# Patient Record
Sex: MALE | Race: WHITE | NOT HISPANIC OR LATINO | Employment: PART TIME | URBAN - METROPOLITAN AREA
[De-identification: names, ages, dates, MRNs, and addresses within clinical notes are randomized per-mention and may not be internally consistent; named-entity substitution may affect disease eponyms.]

---

## 2023-09-07 PROBLEM — G47.33 OBSTRUCTIVE SLEEP APNEA: Status: ACTIVE | Noted: 2023-09-07

## 2023-09-07 PROBLEM — E78.2 MIXED HYPERLIPIDEMIA: Status: ACTIVE | Noted: 2023-09-07

## 2023-09-07 PROBLEM — R73.9 HYPERGLYCEMIA: Status: ACTIVE | Noted: 2023-09-07

## 2023-09-07 PROBLEM — T14.8XXA CONTUSION OF CLAVICLE: Status: ACTIVE | Noted: 2023-09-07

## 2023-09-07 PROBLEM — M71.10 SEPTIC BURSITIS: Status: ACTIVE | Noted: 2023-09-07

## 2023-09-07 PROBLEM — S51.811A SKIN TEAR OF RIGHT FOREARM WITHOUT COMPLICATION: Status: ACTIVE | Noted: 2023-09-07
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
## 2023-09-07 PROBLEM — E66.9 CLASS 1 OBESITY WITH BODY MASS INDEX (BMI) OF 33.0 TO 33.9 IN ADULT: Status: ACTIVE | Noted: 2023-09-07

## 2023-09-07 PROBLEM — E66.9 OBESE: Status: ACTIVE | Noted: 2023-09-07

## 2023-09-07 PROBLEM — S20.219A CHEST WALL CONTUSION: Status: ACTIVE | Noted: 2023-09-07

## 2023-09-07 PROBLEM — E11.9 TYPE 2 DIABETES MELLITUS (MULTI): Status: ACTIVE | Noted: 2023-09-07

## 2023-09-07 PROBLEM — E66.811 CLASS 1 OBESITY WITH BODY MASS INDEX (BMI) OF 33.0 TO 33.9 IN ADULT: Status: ACTIVE | Noted: 2023-09-07

## 2023-09-07 PROBLEM — H35.30 MACULAR DEGENERATION: Status: ACTIVE | Noted: 2023-09-07

## 2023-09-07 PROBLEM — I73.9 PERIPHERAL VASCULAR DISEASE (CMS-HCC): Status: ACTIVE | Noted: 2023-09-07

## 2023-09-07 PROBLEM — M25.469 KNEE SWELLING: Status: ACTIVE | Noted: 2023-09-07

## 2023-09-07 PROBLEM — R60.0 EDEMA OF LEFT LOWER EXTREMITY: Status: ACTIVE | Noted: 2023-09-07

## 2023-09-07 PROBLEM — S50.10XA FOREARM CONTUSION: Status: ACTIVE | Noted: 2023-09-07

## 2023-09-07 PROBLEM — I45.10 RIGHT BUNDLE BRANCH BLOCK (RBBB): Status: ACTIVE | Noted: 2023-09-07

## 2023-09-07 PROBLEM — R91.8 LUNG NODULES: Status: ACTIVE | Noted: 2023-09-07

## 2023-09-07 PROBLEM — I25.3: Status: ACTIVE | Noted: 2023-03-01

## 2023-09-07 PROBLEM — I10 HYPERTENSION, ESSENTIAL, BENIGN: Status: ACTIVE | Noted: 2023-03-01

## 2023-09-07 PROBLEM — I25.10 CAD (CORONARY ARTERY DISEASE): Status: ACTIVE | Noted: 2019-06-17

## 2023-09-07 PROBLEM — I77.810 DILATED AORTIC ROOT (CMS-HCC): Status: ACTIVE | Noted: 2023-09-07

## 2023-09-07 RX ORDER — LISINOPRIL 2.5 MG/1
2.5 TABLET ORAL DAILY
COMMUNITY
Start: 2019-12-30

## 2023-09-07 RX ORDER — HYDROCODONE BITARTRATE AND ACETAMINOPHEN 5; 325 MG/1; MG/1
TABLET ORAL
COMMUNITY
End: 2024-01-18

## 2023-09-07 RX ORDER — CEPHALEXIN 500 MG/1
500 CAPSULE ORAL 2 TIMES DAILY
COMMUNITY
Start: 2020-03-04 | End: 2024-01-18

## 2023-09-07 RX ORDER — SPIRONOLACTONE 25 MG/1
25 TABLET ORAL DAILY
COMMUNITY
Start: 2022-08-12

## 2023-09-07 RX ORDER — CARVEDILOL 6.25 MG/1
6.25 TABLET ORAL
COMMUNITY
End: 2024-01-18

## 2023-09-07 RX ORDER — DOXYCYCLINE HYCLATE 100 MG/1
100 TABLET, DELAYED RELEASE ORAL 2 TIMES DAILY
COMMUNITY
Start: 2020-03-04 | End: 2024-01-18

## 2023-09-07 RX ORDER — LISINOPRIL 10 MG/1
10 TABLET ORAL DAILY
COMMUNITY
End: 2024-01-18

## 2023-09-07 RX ORDER — ACETAMINOPHEN, DEXTROMETHORPHAN HYDROBROMIDE, GUAIFENESIN, AND PHENYLEPHRINE HYDROCHLORIDE 325; 10; 200; 5 MG/15ML; MG/15ML; MG/15ML; MG/15ML
SOLUTION ORAL
COMMUNITY
End: 2024-01-18

## 2023-09-07 RX ORDER — TAMSULOSIN HYDROCHLORIDE 0.4 MG/1
0.4 CAPSULE ORAL DAILY
COMMUNITY
End: 2024-01-18

## 2023-09-07 RX ORDER — CARVEDILOL 3.12 MG/1
1 TABLET ORAL DAILY
COMMUNITY
Start: 2019-12-30 | End: 2024-02-22 | Stop reason: SDUPTHER

## 2023-09-07 RX ORDER — ACETAMINOPHEN 500 MG
TABLET ORAL
COMMUNITY

## 2023-09-07 RX ORDER — TORSEMIDE 20 MG/1
20 TABLET ORAL 2 TIMES WEEKLY
COMMUNITY

## 2023-09-07 RX ORDER — ATORVASTATIN CALCIUM 10 MG/1
TABLET, FILM COATED ORAL
COMMUNITY
Start: 2022-08-12 | End: 2023-12-18 | Stop reason: SDUPTHER

## 2023-09-07 RX ORDER — ATORVASTATIN CALCIUM 20 MG/1
20 TABLET, FILM COATED ORAL DAILY
COMMUNITY
End: 2024-01-18

## 2023-09-07 RX ORDER — ASPIRIN 81 MG/1
81 TABLET ORAL DAILY
COMMUNITY
End: 2024-03-25

## 2023-09-07 RX ORDER — METHYLPHENIDATE HYDROCHLORIDE 10 MG/1
CAPSULE, EXTENDED RELEASE ORAL
COMMUNITY
End: 2024-01-18

## 2023-09-07 RX ORDER — SIMETHICONE 80 MG
TABLET,CHEWABLE ORAL
COMMUNITY
End: 2024-01-18

## 2023-09-07 RX ORDER — METHYLPHENIDATE HYDROCHLORIDE 5 MG/1
TABLET ORAL
COMMUNITY
Start: 2019-12-30

## 2023-10-12 ENCOUNTER — OFFICE VISIT (OUTPATIENT)
Dept: CARDIOLOGY | Facility: CLINIC | Age: 83
End: 2023-10-12
Payer: COMMERCIAL

## 2023-10-12 VITALS
HEART RATE: 66 BPM | SYSTOLIC BLOOD PRESSURE: 116 MMHG | HEIGHT: 68 IN | DIASTOLIC BLOOD PRESSURE: 71 MMHG | WEIGHT: 212 LBS | BODY MASS INDEX: 32.13 KG/M2

## 2023-10-12 DIAGNOSIS — N20.0 KIDNEY STONE: ICD-10-CM

## 2023-10-12 DIAGNOSIS — I77.810 DILATED AORTIC ROOT (CMS-HCC): ICD-10-CM

## 2023-10-12 DIAGNOSIS — I25.10 CORONARY ARTERY DISEASE INVOLVING NATIVE HEART, UNSPECIFIED VESSEL OR LESION TYPE, UNSPECIFIED WHETHER ANGINA PRESENT: ICD-10-CM

## 2023-10-12 DIAGNOSIS — E78.2 MIXED HYPERLIPIDEMIA: ICD-10-CM

## 2023-10-12 DIAGNOSIS — I10 HYPERTENSION, ESSENTIAL, BENIGN: ICD-10-CM

## 2023-10-12 DIAGNOSIS — I73.9 PERIPHERAL VASCULAR DISEASE (CMS-HCC): ICD-10-CM

## 2023-10-12 PROCEDURE — 99214 OFFICE O/P EST MOD 30 MIN: CPT | Performed by: INTERNAL MEDICINE

## 2023-10-12 PROCEDURE — 1036F TOBACCO NON-USER: CPT | Performed by: INTERNAL MEDICINE

## 2023-10-12 PROCEDURE — 3074F SYST BP LT 130 MM HG: CPT | Performed by: INTERNAL MEDICINE

## 2023-10-12 PROCEDURE — 3078F DIAST BP <80 MM HG: CPT | Performed by: INTERNAL MEDICINE

## 2023-10-12 PROCEDURE — 1159F MED LIST DOCD IN RCRD: CPT | Performed by: INTERNAL MEDICINE

## 2023-10-12 PROCEDURE — 1126F AMNT PAIN NOTED NONE PRSNT: CPT | Performed by: INTERNAL MEDICINE

## 2023-10-12 ASSESSMENT — ENCOUNTER SYMPTOMS
CARDIOVASCULAR NEGATIVE: 1
CONSTITUTIONAL NEGATIVE: 1
NEUROLOGICAL NEGATIVE: 1
RESPIRATORY NEGATIVE: 1

## 2023-10-12 ASSESSMENT — PATIENT HEALTH QUESTIONNAIRE - PHQ9
1. LITTLE INTEREST OR PLEASURE IN DOING THINGS: NOT AT ALL
2. FEELING DOWN, DEPRESSED OR HOPELESS: NOT AT ALL
SUM OF ALL RESPONSES TO PHQ9 QUESTIONS 1 AND 2: 0

## 2023-10-12 NOTE — PROGRESS NOTES
CARDIOLOGY OFFICE VISIT      CHIEF COMPLAINT: Routine follow-up      HISTORY OF PRESENT ILLNESS  82-year-old gentleman with a history of CAD and old anterior myocardial infarction with proximal LAD stent in 2015.  Echocardiogram shows EF of about 45% with apical wall motion abnormality which is unchanged on repeat echocardiogram in March 2023.  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  He has been doing well denies any recent chest pain or shortness of breath he also denies orthopnea proximal nocturnal dyspnea.  Lipids from July 2022 shows cholesterol is 122, LDL is 65, HDL is 38 and triglyceride is 93    ASSESSMENT AND PLAN  1.  CAD with old anterior myocardial infarction as noted above status post LAD stent with no recurrent chest pain, we will continue with lifelong aspirin.  2.  Hypertension: Blood pressure continues to be well controlled current medications which we will continue.  3.  Dilated ascending aorta this is continued to be stable, measuring 3.9 cm on recent echocardiogram.  We will continue with serial follow-up.  4.  Dyslipidemia: With acceptable lipid profile as noted above, continue with lipid-lowering therapy.  Problem List Items Addressed This Visit             ICD-10-CM    CAD (coronary artery disease) I25.10    Dilated aortic root (CMS/HCC) I77.810    Hypertension, essential, benign I10    Mixed hyperlipidemia E78.2    Peripheral vascular disease (CMS/HCC) I73.9       Past Medical History  No past medical history on file.    Social History  Social History     Tobacco Use    Smoking status: Former     Types: Cigarettes    Smokeless tobacco: Never   Substance Use Topics    Alcohol use: Yes    Drug use: Not Currently       Family History     Family History   Problem Relation Name Age of Onset    Hypertension Mother      Diabetes Father      Lung cancer Father      Lung cancer Brother           Allergies:  Allergies   Allergen Reactions    Oxycodone Hcl-Oxycodone-Asa Nausea Only and Other     Vomiting          Outpatient Medications:  Current Outpatient Medications   Medication Instructions    aspirin 81 mg, oral, Daily    atorvastatin (Lipitor) 10 mg tablet oral,  On Mondays, Wednesdays and Fridays    atorvastatin (LIPITOR) 20 mg, oral, Daily    carvedilol (Coreg) 3.125 mg tablet 1 tablet, oral, Daily    carvedilol (COREG) 6.25 mg, oral, 2 times daily with meals    cephalexin (KEFLEX) 500 mg, oral, 2 times daily    cholecalciferol (Vitamin D3) 50 mcg (2,000 unit) capsule oral,  TAKE ONE TABLET DAILY ONLY IN WINTER    doxycycline (DORYX) 100 mg, oral, 2 times daily    HYDROcodone-acetaminophen (Norco) 5-325 mg tablet 1 tablet oral every four hours PRN MILD PAIN    lisinopril 10 mg, oral, Daily    lisinopril 2.5 mg, oral, Daily    methylphenidate (Ritalin) 5 mg tablet 1 tablet on an empty stomach Orally Twice a day    methylphenidate LA (Ritalin LA) 10 mg 24 hr capsule  1 cap(s) orally once a day (in the morning), As Needed<BR>    multivitamin (MULTIPLE VITAMINS ORAL) Orally    dansiwngr-JG-fkybcoei-guaifen (Tylenol Cold and Flu Severe) 5--200 mg/15 mL liquid 10 mL oral four times daily    simethicone (Mylicon) 80 mg chewable tablet 1 tablet oral every four hours PRN FOR GAS<BR>    spironolactone (ALDACTONE) 25 mg, oral, Daily    tamsulosin (FLOMAX) 0.4 mg, oral, Daily    torsemide (Demadex) 20 mg tablet  TAKE ONE TABLET AS NEEDED FOR SWELLING    vit A/vit C/vit E/zinc/copper (PRESERVISION AREDS ORAL) 1 capsule, oral, 2 times daily        Lab Results:    Lab Results   Component Value Date    WBC 6.0 07/15/2022    HGB 13.6 07/15/2022    HCT 41.8 07/15/2022    MCV 98 07/15/2022     07/15/2022     Lab Results   Component Value Date    GLUCOSE 116 (H) 07/15/2022    CALCIUM 9.1 07/15/2022     07/15/2022    K 4.3 07/15/2022    CO2 33 (H) 07/15/2022     07/15/2022    BUN 18 07/15/2022     "CREATININE 0.89 07/15/2022     Lab Results   Component Value Date    CHOL 122 07/15/2022    CHOL 153 04/12/2021    CHOL 138 03/04/2021     Lab Results   Component Value Date    HDL 38.0 (A) 07/15/2022    HDL 38.0 (A) 04/12/2021    HDL 38.0 (A) 03/04/2021     No results found for: \"LDLCALC\"  Lab Results   Component Value Date    TRIG 93 07/15/2022    TRIG 104 04/12/2021    TRIG 72 03/04/2021     No components found for: \"CHOLHDL\"    Cardiac Testing:    Echo: No results found for this or any previous visit from the past 1095 days.     No results found for this or any previous visit.     Stress: Cardiac Nuclear Procedure Results:  No results found for this or any previous visit from the past 365 days.       Cath:     REVIEW OF SYSTEMS  Review of Systems   Constitutional: Negative.   Cardiovascular: Negative.    Respiratory: Negative.     Neurological: Negative.         VITALS  Vitals:    10/12/23 1519   BP: 116/71   Pulse: 66     Wt Readings from Last 4 Encounters:   10/12/23 96.2 kg (212 lb)   03/23/23 100 kg (220 lb 6 oz)   02/16/23 96.6 kg (213 lb)   01/24/23 98.2 kg (216 lb 8 oz)       PHYSICAL EXAM  Constitutional:       Appearance: Healthy appearance.      Comments: Obese   Pulmonary:      Effort: Pulmonary effort is normal.      Breath sounds: Normal breath sounds.   Cardiovascular:      PMI at left midclavicular line. Normal rate. Regular rhythm.      Murmurs: There is no murmur.   Edema:     Peripheral edema present.     Comments: 1-2+ bilateral ankle adema  Neurological:      Mental Status: Alert and oriented to person, place and time.           [unfilled]  "

## 2023-11-15 DIAGNOSIS — I10 HYPERTENSION, ESSENTIAL, BENIGN: Primary | ICD-10-CM

## 2023-11-17 RX ORDER — LISINOPRIL 2.5 MG/1
2.5 TABLET ORAL DAILY
Qty: 90 TABLET | Refills: 3 | OUTPATIENT
Start: 2023-11-17

## 2023-12-13 DIAGNOSIS — E78.2 MIXED HYPERLIPIDEMIA: ICD-10-CM

## 2023-12-18 RX ORDER — ATORVASTATIN CALCIUM 10 MG/1
TABLET, FILM COATED ORAL
Qty: 36 TABLET | Refills: 3 | Status: SHIPPED | OUTPATIENT
Start: 2023-12-18 | End: 2024-01-18
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
## 2024-01-09 ENCOUNTER — OFFICE VISIT (OUTPATIENT)
Dept: UROLOGY | Facility: CLINIC | Age: 84
End: 2024-01-09
Payer: COMMERCIAL

## 2024-01-09 VITALS
HEIGHT: 68 IN | HEART RATE: 80 BPM | SYSTOLIC BLOOD PRESSURE: 116 MMHG | WEIGHT: 212 LBS | BODY MASS INDEX: 32.13 KG/M2 | DIASTOLIC BLOOD PRESSURE: 72 MMHG | TEMPERATURE: 96.8 F

## 2024-01-09 DIAGNOSIS — N20.0 KIDNEY STONE: ICD-10-CM

## 2024-01-09 DIAGNOSIS — N21.0 BLADDER STONE: ICD-10-CM

## 2024-01-09 PROCEDURE — 1036F TOBACCO NON-USER: CPT | Performed by: UROLOGY

## 2024-01-09 PROCEDURE — 1159F MED LIST DOCD IN RCRD: CPT | Performed by: UROLOGY

## 2024-01-09 PROCEDURE — 3078F DIAST BP <80 MM HG: CPT | Performed by: UROLOGY

## 2024-01-09 PROCEDURE — 1126F AMNT PAIN NOTED NONE PRSNT: CPT | Performed by: UROLOGY

## 2024-01-09 PROCEDURE — 3074F SYST BP LT 130 MM HG: CPT | Performed by: UROLOGY

## 2024-01-09 PROCEDURE — 99221 1ST HOSP IP/OBS SF/LOW 40: CPT | Performed by: UROLOGY

## 2024-01-18 ENCOUNTER — OFFICE VISIT (OUTPATIENT)
Dept: PRIMARY CARE | Facility: CLINIC | Age: 84
End: 2024-01-18
Payer: COMMERCIAL

## 2024-01-18 VITALS
RESPIRATION RATE: 18 BRPM | DIASTOLIC BLOOD PRESSURE: 76 MMHG | OXYGEN SATURATION: 96 % | HEART RATE: 65 BPM | BODY MASS INDEX: 31.98 KG/M2 | TEMPERATURE: 95.8 F | HEIGHT: 68 IN | SYSTOLIC BLOOD PRESSURE: 128 MMHG | WEIGHT: 211 LBS

## 2024-01-18 DIAGNOSIS — R91.8 LUNG NODULES: ICD-10-CM

## 2024-01-18 DIAGNOSIS — H35.3232 EXUDATIVE AGE-RELATED MACULAR DEGENERATION, BILATERAL, WITH INACTIVE CHOROIDAL NEOVASCULARIZATION (MULTI): ICD-10-CM

## 2024-01-18 DIAGNOSIS — E78.2 MIXED HYPERLIPIDEMIA: ICD-10-CM

## 2024-01-18 DIAGNOSIS — I10 HYPERTENSION, ESSENTIAL, BENIGN: ICD-10-CM

## 2024-01-18 DIAGNOSIS — J20.9 ACUTE BRONCHITIS, UNSPECIFIED ORGANISM: Primary | ICD-10-CM

## 2024-01-18 DIAGNOSIS — Z00.00 HEALTHCARE MAINTENANCE: ICD-10-CM

## 2024-01-18 PROCEDURE — 3074F SYST BP LT 130 MM HG: CPT | Performed by: FAMILY MEDICINE

## 2024-01-18 PROCEDURE — 1159F MED LIST DOCD IN RCRD: CPT | Performed by: FAMILY MEDICINE

## 2024-01-18 PROCEDURE — 1036F TOBACCO NON-USER: CPT | Performed by: FAMILY MEDICINE

## 2024-01-18 PROCEDURE — 3078F DIAST BP <80 MM HG: CPT | Performed by: FAMILY MEDICINE

## 2024-01-18 PROCEDURE — 1126F AMNT PAIN NOTED NONE PRSNT: CPT | Performed by: FAMILY MEDICINE

## 2024-01-18 PROCEDURE — 99213 OFFICE O/P EST LOW 20 MIN: CPT | Performed by: FAMILY MEDICINE

## 2024-01-18 RX ORDER — AZITHROMYCIN 250 MG/1
TABLET, FILM COATED ORAL
Qty: 6 TABLET | Refills: 0 | Status: SHIPPED | OUTPATIENT
Start: 2024-01-18 | End: 2024-01-23

## 2024-01-18 RX ORDER — BENZONATATE 100 MG/1
100 CAPSULE ORAL 3 TIMES DAILY PRN
Qty: 21 CAPSULE | Refills: 0 | Status: SHIPPED | OUTPATIENT
Start: 2024-01-18 | End: 2024-01-25

## 2024-01-18 RX ORDER — ATORVASTATIN CALCIUM 10 MG/1
TABLET, FILM COATED ORAL
Qty: 36 TABLET | Refills: 3 | Status: SHIPPED | OUTPATIENT
Start: 2024-01-18

## 2024-01-18 ASSESSMENT — ENCOUNTER SYMPTOMS
COUGH: 1
OCCASIONAL FEELINGS OF UNSTEADINESS: 0
DEPRESSION: 0
LOSS OF SENSATION IN FEET: 0

## 2024-01-18 NOTE — PROGRESS NOTES
"Subjective     yR Aguirre is a 83 y.o. male who presents for Cough.    Cough       Pt reports cough, runny nose which has been present for approximately three weeks.  No fevers, chills, body aches, trouble breathing.  No sore throat or ear pain.  No chest pain or chest congestion. His cough has been productive of yellow sputum.  Nonsmoker.  No hx of COPD or asthma.  He is using nyqil and dayquil as needed.      Review of Systems   Respiratory:  Positive for cough.        Objective     Vitals:    01/18/24 1259   BP: 128/76   BP Location: Right arm   Patient Position: Sitting   Pulse: 65   Resp: 18   Temp: 35.4 °C (95.8 °F)   TempSrc: Temporal   SpO2: 96%   Weight: 95.7 kg (211 lb)   Height: 1.727 m (5' 8\")        Current Outpatient Medications   Medication Instructions    aspirin 81 mg, oral, Daily    atorvastatin (Lipitor) 10 mg tablet TAKE 1 TABLET BY MOUTH ON MONDAYS, WEDNESDAYS AND FRIDAYS    azithromycin (Zithromax) 250 mg tablet Take 2 tablets (500 mg) by mouth once daily for 1 day, THEN 1 tablet (250 mg) once daily for 4 days.    benzonatate (TESSALON) 100 mg, oral, 3 times daily PRN, Do not crush or chew.     carvedilol (Coreg) 3.125 mg tablet 1 tablet, oral, Daily    cholecalciferol (Vitamin D3) 50 mcg (2,000 unit) capsule oral,  TAKE ONE TABLET DAILY ONLY IN WINTER    lisinopril 2.5 mg, oral, Daily    methylphenidate (Ritalin) 5 mg tablet 1 tablet on an empty stomach Orally Twice a day    multivitamin (MULTIPLE VITAMINS ORAL) Orally    spironolactone (ALDACTONE) 25 mg, oral, Daily    torsemide (DEMADEX) 20 mg, oral, 2 times weekly,  TAKE ONE TABLET AS NEEDED FOR SWELLING    vit A/vit C/vit E/zinc/copper (PRESERVISION AREDS ORAL) 1 capsule, oral, 2 times daily        Past Surgical History:   Procedure Laterality Date    OTHER SURGICAL HISTORY  12/30/2019    Tonsillectomy    OTHER SURGICAL HISTORY  12/30/2019    Umbilical hernia repair    OTHER SURGICAL HISTORY  12/30/2019    Bariatric surgery "    OTHER SURGICAL HISTORY  04/12/2021    Coronary artery stent placement    OTHER SURGICAL HISTORY  07/15/2022    Sigmoidoscopy    OTHER SURGICAL HISTORY  01/06/2022    Mao-en-Y gastric bypass    OTHER SURGICAL HISTORY  01/06/2022    Facial surgery    OTHER SURGICAL HISTORY  01/06/2022    Hernia repair    OTHER SURGICAL HISTORY  01/06/2022    Cataract surgery    OTHER SURGICAL HISTORY  01/06/2022    Anal fissurectomy    OTHER SURGICAL HISTORY  01/06/2022    Percutaneous transluminal coronary angioplasty        Social History     Tobacco Use    Smoking status: Former     Types: Cigarettes    Smokeless tobacco: Never   Vaping Use    Vaping Use: Never used   Substance Use Topics    Alcohol use: Yes    Drug use: Not Currently        Family History   Problem Relation Name Age of Onset    Hypertension Mother      Diabetes Father      Lung cancer Father      Lung cancer Brother          Immunization History   Administered Date(s) Administered    Flu vaccine, quadrivalent, high-dose, preservative free, age 65y+ (FLUZONE) 12/05/2021, 10/23/2022    Influenza, High Dose Seasonal, Preservative Free 12/01/2017, 10/25/2018, 12/16/2019    Influenza, Seasonal, Quadrivalent, Adjuvanted 09/29/2020, 10/06/2023    Influenza, Unspecified 10/01/2018, 10/01/2020    Influenza, seasonal, injectable 10/01/2019    Moderna COVID-19 vaccine, Fall 2023, 12 yeasrs and older (50mcg/0.5mL) 11/12/2023    Moderna SARS-CoV-2 Vaccination 01/07/2021, 02/04/2021, 11/07/2021    Pneumococcal conjugate vaccine, 13-valent (PREVNAR 13) 12/30/2019    Pneumococcal polysaccharide vaccine, 23-valent, age 2 years and older (PNEUMOVAX 23) 12/01/2017, 10/01/2018    Small pox and monkeypox vaccine (Jynneos) *check dose/route* 12/06/2023    Zoster vaccine, recombinant, adult (SHINGRIX) 10/04/2020, 03/05/2021        Physical Exam  Constitutional:       General: He is not in acute distress.     Appearance: He is not toxic-appearing.   HENT:      Head: Normocephalic and  atraumatic.      Right Ear: Tympanic membrane, ear canal and external ear normal.      Left Ear: Tympanic membrane, ear canal and external ear normal.      Nose: Congestion and rhinorrhea present.      Mouth/Throat:      Mouth: Mucous membranes are moist.      Pharynx: Oropharynx is clear. No oropharyngeal exudate or posterior oropharyngeal erythema.   Eyes:      Conjunctiva/sclera: Conjunctivae normal.   Cardiovascular:      Rate and Rhythm: Normal rate and regular rhythm.   Pulmonary:      Effort: Pulmonary effort is normal. No respiratory distress.      Breath sounds: Normal breath sounds. No wheezing, rhonchi or rales.   Lymphadenopathy:      Cervical: No cervical adenopathy.   Skin:     General: Skin is warm and dry.      Findings: No rash.   Neurological:      Mental Status: He is alert and oriented to person, place, and time. Mental status is at baseline.   Psychiatric:         Mood and Affect: Mood normal.         Behavior: Behavior normal.         Problem List Items Addressed This Visit       Hypertension, essential, benign    Lung nodules    Mixed hyperlipidemia    Relevant Medications    atorvastatin (Lipitor) 10 mg tablet    Exudative age-related macular degeneration, bilateral, with inactive choroidal neovascularization (CMS/HCC)     Other Visit Diagnoses       Acute bronchitis, unspecified organism    -  Primary    Relevant Medications    azithromycin (Zithromax) 250 mg tablet    benzonatate (Tessalon) 100 mg capsule    Healthcare maintenance        Relevant Orders    Follow Up In [facility] - PCP - Established            Assessment/Plan     Acute bronchitis - treat with zpak, tessalon, Follow up if no improvement or if symptoms worsen.  Proceed to the nearest emergency department if condition worsens significantly/becomes severe in nature.

## 2024-01-19 ENCOUNTER — APPOINTMENT (OUTPATIENT)
Dept: RADIOLOGY | Facility: CLINIC | Age: 84
End: 2024-01-19
Payer: COMMERCIAL

## 2024-02-02 ENCOUNTER — HOSPITAL ENCOUNTER (OUTPATIENT)
Dept: RADIOLOGY | Facility: CLINIC | Age: 84
Discharge: HOME | End: 2024-02-02
Payer: COMMERCIAL

## 2024-02-02 DIAGNOSIS — N20.0 KIDNEY STONE: ICD-10-CM

## 2024-02-02 PROCEDURE — 74176 CT ABD & PELVIS W/O CONTRAST: CPT

## 2024-02-02 PROCEDURE — 74176 CT ABD & PELVIS W/O CONTRAST: CPT | Performed by: RADIOLOGY

## 2024-02-21 DIAGNOSIS — I25.10 CORONARY ARTERY DISEASE INVOLVING NATIVE HEART, UNSPECIFIED VESSEL OR LESION TYPE, UNSPECIFIED WHETHER ANGINA PRESENT: Primary | ICD-10-CM

## 2024-02-22 RX ORDER — CARVEDILOL 3.12 MG/1
3.12 TABLET ORAL DAILY
Qty: 90 TABLET | Refills: 3 | Status: SHIPPED | OUTPATIENT
Start: 2024-02-22

## 2024-03-11 ENCOUNTER — HOSPITAL ENCOUNTER (OUTPATIENT)
Dept: RADIOLOGY | Facility: CLINIC | Age: 84
Discharge: HOME | End: 2024-03-11
Payer: COMMERCIAL

## 2024-03-11 DIAGNOSIS — R91.8 OTHER NONSPECIFIC ABNORMAL FINDING OF LUNG FIELD: ICD-10-CM

## 2024-03-11 PROCEDURE — 71250 CT THORAX DX C-: CPT | Performed by: STUDENT IN AN ORGANIZED HEALTH CARE EDUCATION/TRAINING PROGRAM

## 2024-03-11 PROCEDURE — 71250 CT THORAX DX C-: CPT

## 2024-03-21 ENCOUNTER — APPOINTMENT (OUTPATIENT)
Dept: SURGERY | Facility: HOSPITAL | Age: 84
End: 2024-03-21
Payer: COMMERCIAL
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
## 2024-03-25 ENCOUNTER — OFFICE VISIT (OUTPATIENT)
Dept: PRIMARY CARE | Facility: CLINIC | Age: 84
End: 2024-03-25
Payer: COMMERCIAL

## 2024-03-25 VITALS
HEART RATE: 66 BPM | HEIGHT: 68 IN | WEIGHT: 215 LBS | BODY MASS INDEX: 32.58 KG/M2 | OXYGEN SATURATION: 95 % | DIASTOLIC BLOOD PRESSURE: 69 MMHG | TEMPERATURE: 97 F | RESPIRATION RATE: 18 BRPM | SYSTOLIC BLOOD PRESSURE: 111 MMHG

## 2024-03-25 DIAGNOSIS — G47.33 OBSTRUCTIVE SLEEP APNEA: ICD-10-CM

## 2024-03-25 DIAGNOSIS — Z00.00 HEALTHCARE MAINTENANCE: Primary | ICD-10-CM

## 2024-03-25 DIAGNOSIS — R91.8 LUNG NODULES: ICD-10-CM

## 2024-03-25 DIAGNOSIS — I10 HYPERTENSION, ESSENTIAL, BENIGN: ICD-10-CM

## 2024-03-25 DIAGNOSIS — I25.3 PSEUDOANEURYSM OF LEFT VENTRICLE OF HEART: ICD-10-CM

## 2024-03-25 DIAGNOSIS — E66.09 CLASS 1 OBESITY DUE TO EXCESS CALORIES WITH BODY MASS INDEX (BMI) OF 33.0 TO 33.9 IN ADULT, UNSPECIFIED WHETHER SERIOUS COMORBIDITY PRESENT: ICD-10-CM

## 2024-03-25 DIAGNOSIS — I25.10 CORONARY ARTERY DISEASE INVOLVING NATIVE HEART, UNSPECIFIED VESSEL OR LESION TYPE, UNSPECIFIED WHETHER ANGINA PRESENT: ICD-10-CM

## 2024-03-25 DIAGNOSIS — R73.9 HYPERGLYCEMIA: ICD-10-CM

## 2024-03-25 PROCEDURE — 3074F SYST BP LT 130 MM HG: CPT | Performed by: FAMILY MEDICINE

## 2024-03-25 PROCEDURE — 1159F MED LIST DOCD IN RCRD: CPT | Performed by: FAMILY MEDICINE

## 2024-03-25 PROCEDURE — 1158F ADVNC CARE PLAN TLK DOCD: CPT | Performed by: FAMILY MEDICINE

## 2024-03-25 PROCEDURE — 99397 PER PM REEVAL EST PAT 65+ YR: CPT | Performed by: FAMILY MEDICINE

## 2024-03-25 PROCEDURE — 1123F ACP DISCUSS/DSCN MKR DOCD: CPT | Performed by: FAMILY MEDICINE

## 2024-03-25 PROCEDURE — 3078F DIAST BP <80 MM HG: CPT | Performed by: FAMILY MEDICINE

## 2024-03-25 PROCEDURE — 1036F TOBACCO NON-USER: CPT | Performed by: FAMILY MEDICINE

## 2024-03-25 ASSESSMENT — ENCOUNTER SYMPTOMS
HEADACHES: 0
DEPRESSION: 0
OCCASIONAL FEELINGS OF UNSTEADINESS: 0
LOSS OF SENSATION IN FEET: 0
SHORTNESS OF BREATH: 0

## 2024-03-25 ASSESSMENT — PATIENT HEALTH QUESTIONNAIRE - PHQ9
2. FEELING DOWN, DEPRESSED OR HOPELESS: NOT AT ALL
1. LITTLE INTEREST OR PLEASURE IN DOING THINGS: NOT AT ALL
SUM OF ALL RESPONSES TO PHQ9 QUESTIONS 1 AND 2: 0
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
## 2024-03-26 ENCOUNTER — APPOINTMENT (OUTPATIENT)
Dept: UROLOGY | Facility: CLINIC | Age: 84
End: 2024-03-26
Payer: COMMERCIAL

## 2024-03-28 ENCOUNTER — OFFICE VISIT (OUTPATIENT)
Dept: UROLOGY | Facility: CLINIC | Age: 84
End: 2024-03-28
Payer: COMMERCIAL

## 2024-03-28 ENCOUNTER — TELEMEDICINE (OUTPATIENT)
Dept: SURGERY | Facility: HOSPITAL | Age: 84
End: 2024-03-28
Payer: COMMERCIAL

## 2024-03-28 VITALS
BODY MASS INDEX: 32.2 KG/M2 | HEART RATE: 73 BPM | WEIGHT: 211.8 LBS | SYSTOLIC BLOOD PRESSURE: 145 MMHG | DIASTOLIC BLOOD PRESSURE: 79 MMHG | TEMPERATURE: 97.3 F

## 2024-03-28 DIAGNOSIS — N21.0 BLADDER STONE: ICD-10-CM

## 2024-03-28 DIAGNOSIS — R91.1 LUNG NODULE: ICD-10-CM

## 2024-03-28 DIAGNOSIS — R91.8 LUNG NODULES: Primary | ICD-10-CM

## 2024-03-28 PROCEDURE — 3078F DIAST BP <80 MM HG: CPT | Performed by: UROLOGY

## 2024-03-28 PROCEDURE — 1123F ACP DISCUSS/DSCN MKR DOCD: CPT | Performed by: THORACIC SURGERY (CARDIOTHORACIC VASCULAR SURGERY)

## 2024-03-28 PROCEDURE — 3077F SYST BP >= 140 MM HG: CPT | Performed by: UROLOGY

## 2024-03-28 PROCEDURE — 1159F MED LIST DOCD IN RCRD: CPT | Performed by: UROLOGY

## 2024-03-28 PROCEDURE — 1159F MED LIST DOCD IN RCRD: CPT | Performed by: THORACIC SURGERY (CARDIOTHORACIC VASCULAR SURGERY)

## 2024-03-28 PROCEDURE — 99213 OFFICE O/P EST LOW 20 MIN: CPT | Performed by: UROLOGY

## 2024-03-28 PROCEDURE — 99441 PR PHYS/QHP TELEPHONE EVALUATION 5-10 MIN: CPT | Performed by: THORACIC SURGERY (CARDIOTHORACIC VASCULAR SURGERY)

## 2024-03-28 NOTE — PROGRESS NOTES
Subjective   Patient ID: Ry Aguirre is a 83 y.o. male who presents for Nephrolithiasis. Last seen 1/9/24 when Prostate was smooth, no hard nodules, slight enlargement. No concern for prostate cancer. Repeat CT scan of abdomen pelvis, to decide if stone treatment is required. Follow up to review CT result.    HPI  We reviewed the CT results together. Patient relates that his urinary symptoms include a slow stream.  Patient feels that he should increase his hydration.     CT a/p Results  IMPRESSION:  1. Bilateral renal cysts.  2. 5 x 3 mm nonobstructing left renal calculus versus cyst wall  calcification.  3. Minute gallstones are suspect.  4. Coronary artery calcification.  5. Small hiatal and minute umbilical hernias.  6. Bladder calculus versus right posterior wall calcification.  7. Diverticulosis.  8. Up to 6 mm bilateral lung nodules are stable since March 2023.  9. L4-5 disc herniation.    Review of Systems  A 12 system review was completed and is negative with the exception of those signs and symptoms noted in the history of present illness.    Objective   Physical Exam  General: in NAD, appears stated age  Head: normocephalic, atraumatic  Respiratory: normal effort, no use of accessory muscles  Cardiovascular: no edema noted  Skin: normal turgor, no rashes  Neurologic: grossly intact, oriented to person/place/time  Psychiatric: mode and affect appropriate     Assessment/Plan   Problem List Items Addressed This Visit    None  Visit Diagnoses         Codes    Bladder stone     N21.0    Relevant Orders    Follow Up In Urology    CT abdomen pelvis wo IV contrast          We discussed the CT findings of the renal cysts, 5 mm kidney stone, and 9 mm bladder stone that has been unchanged. We will continue to watch. Patient will call about hematuria, left flank pain, or dysuria. Order CT a/p wo contrast for 1 year and see the patient back in follow-up with results.     Follow-up in 1 year for continued  management of renal and bladder calculii.     Scribe Attestation  By signing my name below, I, Darek Andres   attest that this documentation has been prepared under the direction and in the presence of Michi Da Silva MD.

## 2024-04-11 ENCOUNTER — APPOINTMENT (OUTPATIENT)
Dept: CARDIOLOGY | Facility: CLINIC | Age: 84
End: 2024-04-11
Payer: COMMERCIAL

## 2024-04-25 ENCOUNTER — APPOINTMENT (OUTPATIENT)
Dept: CARDIOLOGY | Facility: CLINIC | Age: 84
End: 2024-04-25
Payer: COMMERCIAL

## 2024-05-21 ENCOUNTER — LAB (OUTPATIENT)
Dept: LAB | Facility: LAB | Age: 84
End: 2024-05-21
Payer: COMMERCIAL

## 2024-05-21 DIAGNOSIS — Z00.00 HEALTHCARE MAINTENANCE: ICD-10-CM

## 2024-05-21 DIAGNOSIS — I25.10 CORONARY ARTERY DISEASE INVOLVING NATIVE HEART, UNSPECIFIED VESSEL OR LESION TYPE, UNSPECIFIED WHETHER ANGINA PRESENT: ICD-10-CM

## 2024-05-21 DIAGNOSIS — R73.9 HYPERGLYCEMIA: ICD-10-CM

## 2024-05-21 DIAGNOSIS — I10 HYPERTENSION, ESSENTIAL, BENIGN: ICD-10-CM

## 2024-05-21 LAB
ALBUMIN SERPL BCP-MCNC: 3.9 G/DL (ref 3.4–5)
ALP SERPL-CCNC: 65 U/L (ref 33–136)
ALT SERPL W P-5'-P-CCNC: 9 U/L (ref 10–52)
ANION GAP SERPL CALC-SCNC: 10 MMOL/L (ref 10–20)
AST SERPL W P-5'-P-CCNC: 13 U/L (ref 9–39)
BASOPHILS # BLD AUTO: 0.04 X10*3/UL (ref 0–0.1)
BASOPHILS NFR BLD AUTO: 0.7 %
BILIRUB SERPL-MCNC: 0.7 MG/DL (ref 0–1.2)
BUN SERPL-MCNC: 20 MG/DL (ref 6–23)
CALCIUM SERPL-MCNC: 9.2 MG/DL (ref 8.6–10.3)
CHLORIDE SERPL-SCNC: 105 MMOL/L (ref 98–107)
CHOLEST SERPL-MCNC: 147 MG/DL (ref 0–199)
CHOLESTEROL/HDL RATIO: 3.8
CO2 SERPL-SCNC: 31 MMOL/L (ref 21–32)
CREAT SERPL-MCNC: 0.9 MG/DL (ref 0.5–1.3)
EGFRCR SERPLBLD CKD-EPI 2021: 84 ML/MIN/1.73M*2
EOSINOPHIL # BLD AUTO: 0.38 X10*3/UL (ref 0–0.4)
EOSINOPHIL NFR BLD AUTO: 6.5 %
ERYTHROCYTE [DISTWIDTH] IN BLOOD BY AUTOMATED COUNT: 12.4 % (ref 11.5–14.5)
EST. AVERAGE GLUCOSE BLD GHB EST-MCNC: 140 MG/DL
GLUCOSE SERPL-MCNC: 103 MG/DL (ref 74–99)
HBA1C MFR BLD: 6.5 %
HCT VFR BLD AUTO: 43.2 % (ref 41–52)
HDLC SERPL-MCNC: 38.6 MG/DL
HGB BLD-MCNC: 14 G/DL (ref 13.5–17.5)
IMM GRANULOCYTES # BLD AUTO: 0.02 X10*3/UL (ref 0–0.5)
IMM GRANULOCYTES NFR BLD AUTO: 0.3 % (ref 0–0.9)
LDLC SERPL CALC-MCNC: 83 MG/DL
LYMPHOCYTES # BLD AUTO: 2.38 X10*3/UL (ref 0.8–3)
LYMPHOCYTES NFR BLD AUTO: 40.8 %
MCH RBC QN AUTO: 32 PG (ref 26–34)
MCHC RBC AUTO-ENTMCNC: 32.4 G/DL (ref 32–36)
MCV RBC AUTO: 99 FL (ref 80–100)
MONOCYTES # BLD AUTO: 0.37 X10*3/UL (ref 0.05–0.8)
MONOCYTES NFR BLD AUTO: 6.3 %
NEUTROPHILS # BLD AUTO: 2.65 X10*3/UL (ref 1.6–5.5)
NEUTROPHILS NFR BLD AUTO: 45.4 %
NON HDL CHOLESTEROL: 108 MG/DL (ref 0–149)
NRBC BLD-RTO: 0 /100 WBCS (ref 0–0)
PLATELET # BLD AUTO: 225 X10*3/UL (ref 150–450)
POTASSIUM SERPL-SCNC: 4.1 MMOL/L (ref 3.5–5.3)
PROT SERPL-MCNC: 6.1 G/DL (ref 6.4–8.2)
RBC # BLD AUTO: 4.37 X10*6/UL (ref 4.5–5.9)
SODIUM SERPL-SCNC: 142 MMOL/L (ref 136–145)
TRIGL SERPL-MCNC: 125 MG/DL (ref 0–149)
VLDL: 25 MG/DL (ref 0–40)
WBC # BLD AUTO: 5.8 X10*3/UL (ref 4.4–11.3)

## 2024-05-21 PROCEDURE — 80061 LIPID PANEL: CPT

## 2024-05-21 PROCEDURE — 85025 COMPLETE CBC W/AUTO DIFF WBC: CPT

## 2024-05-21 PROCEDURE — 83036 HEMOGLOBIN GLYCOSYLATED A1C: CPT

## 2024-05-21 PROCEDURE — 36415 COLL VENOUS BLD VENIPUNCTURE: CPT

## 2024-05-21 PROCEDURE — 80053 COMPREHEN METABOLIC PANEL: CPT

## 2024-06-10 ENCOUNTER — OFFICE VISIT (OUTPATIENT)
Dept: PRIMARY CARE | Facility: CLINIC | Age: 84
End: 2024-06-10
Payer: COMMERCIAL

## 2024-06-10 VITALS
OXYGEN SATURATION: 98 % | BODY MASS INDEX: 31.98 KG/M2 | DIASTOLIC BLOOD PRESSURE: 65 MMHG | WEIGHT: 211 LBS | HEIGHT: 68 IN | RESPIRATION RATE: 18 BRPM | SYSTOLIC BLOOD PRESSURE: 120 MMHG | HEART RATE: 58 BPM | TEMPERATURE: 97.3 F

## 2024-06-10 DIAGNOSIS — R91.8 LUNG NODULES: ICD-10-CM

## 2024-06-10 DIAGNOSIS — I25.10 CORONARY ARTERY DISEASE INVOLVING NATIVE HEART, UNSPECIFIED VESSEL OR LESION TYPE, UNSPECIFIED WHETHER ANGINA PRESENT: ICD-10-CM

## 2024-06-10 DIAGNOSIS — G47.33 OBSTRUCTIVE SLEEP APNEA: ICD-10-CM

## 2024-06-10 DIAGNOSIS — I10 HYPERTENSION, ESSENTIAL, BENIGN: ICD-10-CM

## 2024-06-10 DIAGNOSIS — I25.3 PSEUDOANEURYSM OF LEFT VENTRICLE OF HEART: ICD-10-CM

## 2024-06-10 DIAGNOSIS — E11.9 TYPE 2 DIABETES MELLITUS WITHOUT COMPLICATION, WITHOUT LONG-TERM CURRENT USE OF INSULIN (MULTI): Primary | ICD-10-CM

## 2024-06-10 PROCEDURE — 1160F RVW MEDS BY RX/DR IN RCRD: CPT | Performed by: FAMILY MEDICINE

## 2024-06-10 PROCEDURE — 1036F TOBACCO NON-USER: CPT | Performed by: FAMILY MEDICINE

## 2024-06-10 PROCEDURE — 1159F MED LIST DOCD IN RCRD: CPT | Performed by: FAMILY MEDICINE

## 2024-06-10 PROCEDURE — 1158F ADVNC CARE PLAN TLK DOCD: CPT | Performed by: FAMILY MEDICINE

## 2024-06-10 PROCEDURE — 3078F DIAST BP <80 MM HG: CPT | Performed by: FAMILY MEDICINE

## 2024-06-10 PROCEDURE — 99214 OFFICE O/P EST MOD 30 MIN: CPT | Performed by: FAMILY MEDICINE

## 2024-06-10 PROCEDURE — 3074F SYST BP LT 130 MM HG: CPT | Performed by: FAMILY MEDICINE

## 2024-06-10 PROCEDURE — 1123F ACP DISCUSS/DSCN MKR DOCD: CPT | Performed by: FAMILY MEDICINE

## 2024-06-10 ASSESSMENT — ENCOUNTER SYMPTOMS
HEADACHES: 0
SHORTNESS OF BREATH: 0

## 2024-06-10 NOTE — PROGRESS NOTES
"Subjective     Ry Pedro Aguirre is a 83 y.o. male who presents for Diabetes.    Diabetes  Pertinent negatives for hypoglycemia include no headaches. Pertinent negatives for diabetes include no chest pain.        Pt is here to discuss new onset DM II , A1c of 6.5% on 5/21/24.  He reports poor diet, high in sugars/carbs.  He is motivated to eat better.      He has hypertension and is on spironolactone, coreg, lisinopril.  No side effects to his medications.       Review of Systems   Respiratory:  Negative for shortness of breath.    Cardiovascular:  Negative for chest pain.   Neurological:  Negative for headaches.       Objective     Vitals:    06/10/24 1332   BP: 120/65   BP Location: Right arm   Patient Position: Sitting   Pulse: 58   Resp: 18   Temp: 36.3 °C (97.3 °F)   TempSrc: Temporal   SpO2: 98%   Weight: 95.7 kg (211 lb)   Height: 1.727 m (5' 8\")        Current Outpatient Medications   Medication Instructions    atorvastatin (Lipitor) 10 mg tablet TAKE 1 TABLET BY MOUTH ON MONDAYS, WEDNESDAYS AND FRIDAYS    carvedilol (COREG) 3.125 mg, oral, Daily    cholecalciferol (Vitamin D3) 50 mcg (2,000 unit) capsule oral,  TAKE ONE TABLET DAILY ONLY IN WINTER    lisinopril 2.5 mg, oral, Daily    methylphenidate (Ritalin) 5 mg tablet 1 tablet on an empty stomach Orally Twice a day    multivitamin (MULTIPLE VITAMINS ORAL) Orally    spironolactone (ALDACTONE) 25 mg, oral, Daily    torsemide (DEMADEX) 20 mg, oral, 2 times weekly,  TAKE ONE TABLET AS NEEDED FOR SWELLING    vit A/vit C/vit E/zinc/copper (PRESERVISION AREDS ORAL) 1 capsule, oral, 2 times daily        Allergies   Allergen Reactions    Oxycodone Hcl-Oxycodone-Asa Nausea Only and Other     Vomiting      Oxycodone-Aspirin GI Upset and Nausea/vomiting        Past Surgical History:   Procedure Laterality Date    OTHER SURGICAL HISTORY  12/30/2019    Tonsillectomy    OTHER SURGICAL HISTORY  12/30/2019    Umbilical hernia repair    OTHER SURGICAL HISTORY  " 12/30/2019    Bariatric surgery    OTHER SURGICAL HISTORY  04/12/2021    Coronary artery stent placement    OTHER SURGICAL HISTORY  07/15/2022    Sigmoidoscopy    OTHER SURGICAL HISTORY  01/06/2022    Mao-en-Y gastric bypass    OTHER SURGICAL HISTORY  01/06/2022    Facial surgery    OTHER SURGICAL HISTORY  01/06/2022    Hernia repair    OTHER SURGICAL HISTORY  01/06/2022    Cataract surgery    OTHER SURGICAL HISTORY  01/06/2022    Anal fissurectomy    OTHER SURGICAL HISTORY  01/06/2022    Percutaneous transluminal coronary angioplasty        Social History     Tobacco Use    Smoking status: Former     Types: Cigarettes    Smokeless tobacco: Never   Vaping Use    Vaping status: Never Used   Substance Use Topics    Alcohol use: Yes    Drug use: Not Currently        Social History     Substance and Sexual Activity   Alcohol Use Yes       Family History   Problem Relation Name Age of Onset    Hypertension Mother      Diabetes Father      Lung cancer Father      Lung cancer Brother          Immunization History   Administered Date(s) Administered    Flu vaccine, quadrivalent, high-dose, preservative free, age 65y+ (FLUZONE) 12/05/2021, 10/23/2022    Influenza, High Dose Seasonal, Preservative Free 12/01/2017, 10/25/2018, 12/16/2019    Influenza, Seasonal, Quadrivalent, Adjuvanted 09/29/2020, 10/06/2023    Influenza, Unspecified 10/01/2018, 10/01/2020    Influenza, seasonal, injectable 10/01/2019    Moderna COVID-19 vaccine, Fall 2023, 12 yeasrs and older (50mcg/0.5mL) 11/12/2023    Moderna SARS-CoV-2 Vaccination 01/07/2021, 02/04/2021, 11/07/2021    Pneumococcal conjugate vaccine, 13-valent (PREVNAR 13) 12/30/2019    Pneumococcal polysaccharide vaccine, 23-valent, age 2 years and older (PNEUMOVAX 23) 12/01/2017, 10/01/2018    Small pox and monkeypox vaccine (Jynneos) *check dose/route* 12/06/2023, 03/19/2024    Zoster vaccine, recombinant, adult (SHINGRIX) 10/04/2020, 03/05/2021        Physical Exam  Vitals reviewed.    Constitutional:       General: He is not in acute distress.     Appearance: Normal appearance. He is well-developed. He is obese.   HENT:      Head: Normocephalic and atraumatic.   Eyes:      General: Lids are normal.      Conjunctiva/sclera:      Right eye: Right conjunctiva is not injected.      Left eye: Left conjunctiva is not injected.   Cardiovascular:      Rate and Rhythm: Normal rate and regular rhythm.      Heart sounds: No murmur heard.  Pulmonary:      Effort: Pulmonary effort is normal. No respiratory distress.      Breath sounds: Normal breath sounds. No wheezing, rhonchi or rales.   Skin:     General: Skin is warm and dry.      Findings: No rash.   Neurological:      Mental Status: He is alert and oriented to person, place, and time. Mental status is at baseline.   Psychiatric:         Mood and Affect: Mood normal.         Behavior: Behavior normal.         Problem List Items Addressed This Visit       CAD (coronary artery disease)     Managed by Dr. Clark , cardiology   On statin.  Cannot tolerate asa 81.          Hypertension, essential, benign    Lung nodules    Obstructive sleep apnea    Pseudoaneurysm of left ventricle of heart     Evaluated by Dr. Clark, cardiology           Other Visit Diagnoses       Type 2 diabetes mellitus without complication, without long-term current use of insulin (Multi)    -  Primary            Assessment/Plan     DM II - newly diagnosed, A1c on 5/21/24 was 6.5% - he will try to cut back on carbs/sugars in his diet and I will recheck A1c again in 3 months     Obesity, BMI today ws 32 - Hx of bariatric sleeve surgery     HTN - on spironolactone, coreg, lisinopril - controlled     Hx of CAD, s/p PCI x 1 , on atorvastatin 10 mg daily  - no longer takes asa 81 mg daily due to bruising issues. He takes beta blocker, spironolactone -  sees cardiology, Dr. Clark - pt advised to see if his cardiologist recommends increasing statin to get LDL < 70.     SONU - cannot tolerate  cpap.  Pt sees pulmonology, Dr Aquino.      Hypersomnia with sleep apnea - on methylphenidate and modafinil - managed by pulmonology    Lung nodules - sees CT surgery, Dr. Monroy    Hx of macular degeneration - sees eye specialist     Follow up 3 months

## 2024-06-27 ENCOUNTER — APPOINTMENT (OUTPATIENT)
Dept: CARDIOLOGY | Facility: CLINIC | Age: 84
End: 2024-06-27
Payer: COMMERCIAL

## 2024-06-27 VITALS
SYSTOLIC BLOOD PRESSURE: 128 MMHG | HEART RATE: 70 BPM | WEIGHT: 212.8 LBS | BODY MASS INDEX: 32.25 KG/M2 | DIASTOLIC BLOOD PRESSURE: 68 MMHG | HEIGHT: 68 IN

## 2024-06-27 DIAGNOSIS — Z87.891 FORMER SMOKER: ICD-10-CM

## 2024-06-27 DIAGNOSIS — E78.2 MIXED HYPERLIPIDEMIA: ICD-10-CM

## 2024-06-27 DIAGNOSIS — I10 HYPERTENSION, ESSENTIAL, BENIGN: ICD-10-CM

## 2024-06-27 DIAGNOSIS — R07.89 OTHER CHEST PAIN: ICD-10-CM

## 2024-06-27 DIAGNOSIS — I77.810 DILATED AORTIC ROOT (CMS-HCC): ICD-10-CM

## 2024-06-27 DIAGNOSIS — I25.10 CORONARY ARTERY DISEASE INVOLVING NATIVE HEART, UNSPECIFIED VESSEL OR LESION TYPE, UNSPECIFIED WHETHER ANGINA PRESENT: ICD-10-CM

## 2024-06-27 PROCEDURE — 1159F MED LIST DOCD IN RCRD: CPT | Performed by: INTERNAL MEDICINE

## 2024-06-27 PROCEDURE — 93000 ELECTROCARDIOGRAM COMPLETE: CPT | Performed by: INTERNAL MEDICINE

## 2024-06-27 PROCEDURE — 99214 OFFICE O/P EST MOD 30 MIN: CPT | Performed by: INTERNAL MEDICINE

## 2024-06-27 PROCEDURE — 1036F TOBACCO NON-USER: CPT | Performed by: INTERNAL MEDICINE

## 2024-06-27 PROCEDURE — 1123F ACP DISCUSS/DSCN MKR DOCD: CPT | Performed by: INTERNAL MEDICINE

## 2024-06-27 PROCEDURE — 3078F DIAST BP <80 MM HG: CPT | Performed by: INTERNAL MEDICINE

## 2024-06-27 PROCEDURE — 3074F SYST BP LT 130 MM HG: CPT | Performed by: INTERNAL MEDICINE

## 2024-06-27 RX ORDER — REGADENOSON 0.08 MG/ML
0.4 INJECTION, SOLUTION INTRAVENOUS
OUTPATIENT
Start: 2024-06-27

## 2024-06-27 RX ORDER — AMINOPHYLLINE 25 MG/ML
125 INJECTION, SOLUTION INTRAVENOUS ONCE AS NEEDED
OUTPATIENT
Start: 2024-06-27

## 2024-06-27 RX ORDER — METHYLPHENIDATE HYDROCHLORIDE 10 MG/1
10 TABLET ORAL 2 TIMES DAILY
COMMUNITY
Start: 2024-05-29

## 2024-06-27 ASSESSMENT — ENCOUNTER SYMPTOMS
CONSTITUTIONAL NEGATIVE: 1
NEUROLOGICAL NEGATIVE: 1
RESPIRATORY NEGATIVE: 1
CARDIOVASCULAR NEGATIVE: 1

## 2024-06-27 NOTE — PROGRESS NOTES
CARDIOLOGY OFFICE VISIT      CHIEF COMPLAINT  Chief Complaint   Patient presents with    Follow-up     Patient c/o chest tightness on and off lately mainly at night.  Goes away after a few minutes of sitting up.        HISTORY OF PRESENT ILLNESS  Ry Aguirre is a 83 y.o. 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  His most recent echocardiogram in March 2023 shows normalized EF of about 60% with apical wall motion abnormalities that is unchanged.  His EF is improved from 45% on previous echocardiogram.  He describes intermittent feeling of doom with chest pressure that tends to occur in the night, lasting for about a minute and resolves by itself.  He however does not have any exertional angina or shortness of breath.  Recent lipids from April 2, 2024 shows cholesterol is 147, HDL is 38, LDL is 83 and triglycerides 125.  Transaminases are within normal limits.  His EKG today shows sinus rhythm with old anteroseptal changes unchanged when compared to his prior EKG.    ASSESSMENT AND PLAN  1.  CAD s/p 4 in 20 LAD stenting with somewhat atypical chest pain as noted above.  I will get a repeat Lexiscan for reevaluation to rule out any significant ischemia and make further recommendations based on the results.  2.  Hypertension: Blood pressure is well-controlled on his current medications which we will continue.  3.  Dilated ascending aorta: This is stable on recent echocardiogram at 3.9 cm.  4.  Dyslipidemia: With acceptable lipid profile as noted above, continue with lipid-lowering therapy.    Problem List Items Addressed This Visit             ICD-10-CM    CAD (coronary artery disease) I25.10    Dilated aortic root (CMS-HCC) I77.810    Hypertension, essential, benign I10    Relevant Orders    ECG 12 lead (Clinic Performed)    Mixed hyperlipidemia E78.2    Adult BMI 32.0-32.9 kg/sq m Z68.32    Former smoker Z87.891       Recent Cardiovascular  Testing:    Echo-3/2023 CONCLUSIONS:  1. Left ventricular systolic function is normal with a 60-65% estimated ejection fraction.  2. [place] is abnormal.  3. Spectral Doppler shows an impaired relaxation pattern of left ventricular diastolic filling.  4. There is no evidence of cardiac tamponade.  5. No evidence of mitral valve prolapse.  6. There is No tricuspid stenosis.  7. RVSP within normal limits.  8. Aortic valve stenosis is not present.  9. Mild aortic valve regurgitation.  10. Compared with echo 6/2019 LVEF has improved.  Stress-  Cath-  Carotid Ultrasound-    Past Medical History  History reviewed. No pertinent past medical history.    Social History  Social History     Tobacco Use    Smoking status: Former     Types: Cigarettes    Smokeless tobacco: Never   Vaping Use    Vaping status: Never Used   Substance Use Topics    Alcohol use: Yes    Drug use: Not Currently       Family History     Family History   Problem Relation Name Age of Onset    Hypertension Mother      Diabetes Father      Lung cancer Father      Lung cancer Brother          Allergies:  Allergies   Allergen Reactions    Oxycodone Hcl-Oxycodone-Asa Nausea Only and Other     Vomiting      Oxycodone-Aspirin GI Upset and Nausea/vomiting        Outpatient Medications:  Current Outpatient Medications   Medication Instructions    atorvastatin (Lipitor) 10 mg tablet TAKE 1 TABLET BY MOUTH ON MONDAYS, WEDNESDAYS AND FRIDAYS    carvedilol (COREG) 3.125 mg, oral, Daily    cholecalciferol (Vitamin D3) 50 mcg (2,000 unit) capsule oral,  TAKE ONE TABLET DAILY ONLY IN WINTER    lisinopril 2.5 mg, oral, Daily    methylphenidate (Ritalin) 5 mg tablet 1 tablet on an empty stomach Orally Twice a day    methylphenidate (RITALIN) 10 mg, oral, 2 times daily    multivitamin (MULTIPLE VITAMINS ORAL) Orally    spironolactone (ALDACTONE) 25 mg, oral, Daily    torsemide (DEMADEX) 20 mg, oral, 2 times weekly,  TAKE ONE TABLET AS NEEDED FOR SWELLING    vit A/vit C/vit  "E/zinc/copper (PRESERVISION AREDS ORAL) 1 capsule, oral, 2 times daily        Recent Lab Results:    CBC:   Lab Results   Component Value Date    WBC 5.8 05/21/2024    RBC 4.37 (L) 05/21/2024    HGB 14.0 05/21/2024    HCT 43.2 05/21/2024     05/21/2024        CMP:    Lab Results   Component Value Date     05/21/2024    K 4.1 05/21/2024     05/21/2024    CO2 31 05/21/2024    BUN 20 05/21/2024    CREATININE 0.90 05/21/2024    GLUCOSE 103 (H) 05/21/2024    CALCIUM 9.2 05/21/2024       Magnesium:    No results found for: \"MG\"    Lipid Profile:    Lab Results   Component Value Date    TRIG 125 05/21/2024    HDL 38.6 05/21/2024    LDLCALC 83 05/21/2024       TSH:    No results found for: \"TSH\"    BNP:   No results found for: \"BNP\"     PT/INR:    No results found for: \"PROTIME\", \"INR\"    HgBA1c:    Lab Results   Component Value Date    HGBA1C 6.5 (H) 05/21/2024       BMP:  Lab Results   Component Value Date     05/21/2024     07/15/2022     04/12/2021     07/30/2020    K 4.1 05/21/2024    K 4.3 07/15/2022    K 4.3 04/12/2021    K 3.8 07/30/2020     05/21/2024     07/15/2022     04/12/2021     07/30/2020    CO2 31 05/21/2024    CO2 33 (H) 07/15/2022    CO2 33 (H) 04/12/2021    CO2 32 07/30/2020    BUN 20 05/21/2024    BUN 18 07/15/2022    BUN 21 04/12/2021    BUN 19 07/30/2020    CREATININE 0.90 05/21/2024    CREATININE 0.89 07/15/2022    CREATININE 1.06 04/12/2021    CREATININE 0.99 07/30/2020       CBC:  Lab Results   Component Value Date    WBC 5.8 05/21/2024    WBC 6.0 07/15/2022    WBC 6.1 04/12/2021    WBC 8.3 03/04/2020    RBC 4.37 (L) 05/21/2024    RBC 4.27 (L) 07/15/2022    RBC 4.29 (L) 04/12/2021    RBC 4.11 (L) 03/04/2020    HGB 14.0 05/21/2024    HGB 13.6 07/15/2022    HGB 13.9 04/12/2021    HGB 13.0 (L) 03/04/2020    HCT 43.2 05/21/2024    HCT 41.8 07/15/2022    HCT 42.5 04/12/2021    HCT 39.1 (L) 03/04/2020    MCV 99 05/21/2024    MCV 98 " "07/15/2022    MCV 99 04/12/2021    MCV 95 03/04/2020    MCH 32.0 05/21/2024    MCHC 32.4 05/21/2024    MCHC 32.5 07/15/2022    MCHC 32.7 04/12/2021    MCHC 33.2 03/04/2020    RDW 12.4 05/21/2024    RDW 11.9 07/15/2022    RDW 11.9 04/12/2021    RDW 12.3 03/04/2020     05/21/2024     07/15/2022     04/12/2021     03/04/2020       Cardiac Enzymes:    No results found for: \"TROPHS\"    Hepatic Function Panel:    Lab Results   Component Value Date    ALKPHOS 65 05/21/2024    ALT 9 (L) 05/21/2024    AST 13 05/21/2024    PROT 6.1 (L) 05/21/2024    BILITOT 0.7 05/21/2024    BILIDIR 0.1 03/04/2021         REVIEW OF SYSTEMS  Review of Systems   Constitutional: Negative.   Cardiovascular: Negative.    Respiratory: Negative.     Neurological: Negative.        VITALS  Vitals:    06/27/24 1535   BP: 128/68   Pulse: 70     Wt Readings from Last 4 Encounters:   06/27/24 96.5 kg (212 lb 12.8 oz)   06/10/24 95.7 kg (211 lb)   03/28/24 96.1 kg (211 lb 12.8 oz)   03/25/24 97.5 kg (215 lb)       PHYSICAL EXAM  Constitutional:       Appearance: Healthy appearance.      Comments: obese   Neck:      Thyroid: Thyroid normal.      Vascular: JVD normal.   Pulmonary:      Effort: Pulmonary effort is normal.      Breath sounds: Normal breath sounds.   Cardiovascular:      Normal rate. Regular rhythm.      Murmurs: There is no murmur.   Edema:     Peripheral edema absent.   Skin:     General: Skin is warm and dry.   Neurological:      Mental Status: Alert and oriented to person, place and time.         Scribe Attestation  By signing my name below, Antonieta PAYAN LPN  , Scribe   attest that this documentation has been prepared under the direction and in the presence of Cm Clark MD.   "

## 2024-07-31 ENCOUNTER — HOSPITAL ENCOUNTER (OUTPATIENT)
Dept: RADIOLOGY | Facility: CLINIC | Age: 84
Discharge: HOME | End: 2024-07-31
Payer: COMMERCIAL

## 2024-07-31 ENCOUNTER — HOSPITAL ENCOUNTER (OUTPATIENT)
Dept: CARDIOLOGY | Facility: CLINIC | Age: 84
Discharge: HOME | End: 2024-07-31
Payer: COMMERCIAL

## 2024-07-31 DIAGNOSIS — R07.89 OTHER CHEST PAIN: Primary | ICD-10-CM

## 2024-07-31 DIAGNOSIS — R07.89 OTHER CHEST PAIN: ICD-10-CM

## 2024-07-31 PROCEDURE — 2500000004 HC RX 250 GENERAL PHARMACY W/ HCPCS (ALT 636 FOR OP/ED): Performed by: INTERNAL MEDICINE

## 2024-07-31 PROCEDURE — 93017 CV STRESS TEST TRACING ONLY: CPT

## 2024-07-31 PROCEDURE — 3430000001 HC RX 343 DIAGNOSTIC RADIOPHARMACEUTICALS: Performed by: INTERNAL MEDICINE

## 2024-07-31 PROCEDURE — 78452 HT MUSCLE IMAGE SPECT MULT: CPT

## 2024-07-31 PROCEDURE — A9502 TC99M TETROFOSMIN: HCPCS | Performed by: INTERNAL MEDICINE

## 2024-07-31 RX ORDER — REGADENOSON 0.08 MG/ML
0.4 INJECTION, SOLUTION INTRAVENOUS ONCE
Status: CANCELLED | OUTPATIENT
Start: 2024-07-31 | End: 2024-07-31

## 2024-07-31 RX ORDER — REGADENOSON 0.08 MG/ML
0.4 INJECTION, SOLUTION INTRAVENOUS
Status: COMPLETED | OUTPATIENT
Start: 2024-07-31 | End: 2024-07-31

## 2024-08-02 ENCOUNTER — TELEPHONE (OUTPATIENT)
Dept: CARDIOLOGY | Facility: CLINIC | Age: 84
End: 2024-08-02
Payer: COMMERCIAL

## 2024-08-02 NOTE — TELEPHONE ENCOUNTER
----- Message from Cm Clark sent at 7/31/2024  7:57 PM EDT -----  Stress test showed old MI with no new areas of ischemia. This is reassuring.

## 2024-09-09 ENCOUNTER — APPOINTMENT (OUTPATIENT)
Dept: PRIMARY CARE | Facility: CLINIC | Age: 84
End: 2024-09-09
Payer: COMMERCIAL

## 2024-09-09 VITALS
RESPIRATION RATE: 18 BRPM | OXYGEN SATURATION: 95 % | WEIGHT: 210 LBS | SYSTOLIC BLOOD PRESSURE: 114 MMHG | BODY MASS INDEX: 31.83 KG/M2 | DIASTOLIC BLOOD PRESSURE: 58 MMHG | HEART RATE: 66 BPM | HEIGHT: 68 IN | TEMPERATURE: 98.4 F

## 2024-09-09 DIAGNOSIS — R91.8 LUNG NODULES: ICD-10-CM

## 2024-09-09 DIAGNOSIS — G47.33 OBSTRUCTIVE SLEEP APNEA: ICD-10-CM

## 2024-09-09 DIAGNOSIS — Z23 FLU VACCINE NEED: ICD-10-CM

## 2024-09-09 DIAGNOSIS — Z87.442 HISTORY OF KIDNEY STONES: ICD-10-CM

## 2024-09-09 DIAGNOSIS — H35.30 MACULAR DEGENERATION OF LEFT EYE, UNSPECIFIED TYPE: ICD-10-CM

## 2024-09-09 DIAGNOSIS — E66.09 CLASS 1 OBESITY DUE TO EXCESS CALORIES WITH SERIOUS COMORBIDITY AND BODY MASS INDEX (BMI) OF 31.0 TO 31.9 IN ADULT: ICD-10-CM

## 2024-09-09 DIAGNOSIS — I25.10 CORONARY ARTERY DISEASE INVOLVING NATIVE CORONARY ARTERY OF NATIVE HEART WITHOUT ANGINA PECTORIS: ICD-10-CM

## 2024-09-09 DIAGNOSIS — I10 HYPERTENSION, ESSENTIAL, BENIGN: ICD-10-CM

## 2024-09-09 DIAGNOSIS — E11.9 TYPE 2 DIABETES MELLITUS WITHOUT COMPLICATION, WITHOUT LONG-TERM CURRENT USE OF INSULIN (MULTI): Primary | ICD-10-CM

## 2024-09-09 PROBLEM — Z87.448 HISTORY OF BLADDER STONE: Status: ACTIVE | Noted: 2024-09-09

## 2024-09-09 PROBLEM — E66.811 CLASS 1 OBESITY DUE TO EXCESS CALORIES WITH SERIOUS COMORBIDITY AND BODY MASS INDEX (BMI) OF 31.0 TO 31.9 IN ADULT: Status: ACTIVE | Noted: 2023-09-07

## 2024-09-09 LAB — POC HEMOGLOBIN A1C: 6.6 % (ref 4.2–6.5)

## 2024-09-09 PROCEDURE — 90662 IIV NO PRSV INCREASED AG IM: CPT | Performed by: FAMILY MEDICINE

## 2024-09-09 PROCEDURE — 1159F MED LIST DOCD IN RCRD: CPT | Performed by: FAMILY MEDICINE

## 2024-09-09 PROCEDURE — 83036 HEMOGLOBIN GLYCOSYLATED A1C: CPT | Performed by: FAMILY MEDICINE

## 2024-09-09 PROCEDURE — 1158F ADVNC CARE PLAN TLK DOCD: CPT | Performed by: FAMILY MEDICINE

## 2024-09-09 PROCEDURE — 90471 IMMUNIZATION ADMIN: CPT | Performed by: FAMILY MEDICINE

## 2024-09-09 PROCEDURE — 3078F DIAST BP <80 MM HG: CPT | Performed by: FAMILY MEDICINE

## 2024-09-09 PROCEDURE — 1123F ACP DISCUSS/DSCN MKR DOCD: CPT | Performed by: FAMILY MEDICINE

## 2024-09-09 PROCEDURE — 3074F SYST BP LT 130 MM HG: CPT | Performed by: FAMILY MEDICINE

## 2024-09-09 PROCEDURE — 1160F RVW MEDS BY RX/DR IN RCRD: CPT | Performed by: FAMILY MEDICINE

## 2024-09-09 PROCEDURE — 99214 OFFICE O/P EST MOD 30 MIN: CPT | Performed by: FAMILY MEDICINE

## 2024-09-09 PROCEDURE — 1036F TOBACCO NON-USER: CPT | Performed by: FAMILY MEDICINE

## 2024-09-09 ASSESSMENT — ENCOUNTER SYMPTOMS
HYPERTENSION: 1
SHORTNESS OF BREATH: 0
HEADACHES: 0

## 2024-09-09 NOTE — ASSESSMENT & PLAN NOTE
Hx of CAD, s/p PCI  , on atorvastatin 10 mg daily  - no longer takes asa 81 mg daily due to bruising issues. He takes beta blocker, spironolactone -  sees cardiology, Dr. Clark.  Pt had a nuclear stress test on 7/31/24 - per cardiology , the stress test showed old MI with no new areas of ischemia (reassuring).

## 2024-09-09 NOTE — ASSESSMENT & PLAN NOTE
Does not use cpap - cannot tolerate  Hypersomnia with sleep apnea - on methylphenidate and modafinil - managed by pulmonology

## 2024-09-09 NOTE — PROGRESS NOTES
"Subjective     Ry Aguirre is a 83 y.o. male who presents for Hyperlipidemia, Hypertension, and Diabetes.    Hyperlipidemia  Pertinent negatives include no chest pain or shortness of breath.   Hypertension  Associated symptoms include anxiety. Pertinent negatives include no chest pain, headaches or shortness of breath.   Diabetes  Pertinent negatives for hypoglycemia include no headaches. Pertinent negatives for diabetes include no chest pain.      Pt is here to follow up on DM II.  He was diagnosed with DM in May 2024  with an A1c of 6.5%4.  He has tried to eat a little better but still feels he has room to improve on his diet.   His A1c today was 6.6%.  no foot neuropathy.  No foot sores.      He has hypertension and is on spironolactone, coreg, lisinopril.  No side effects to his medications.     He is on atorvastatin 10 mg daily.  He sees cardiology for hx of CAD s/p PCI.    Pt had a nuclear stress test on 7/31/24 - per cardiology , the stress test showed old MI with no new areas of ischemia (reassuring).      Review of Systems   Respiratory:  Negative for shortness of breath.    Cardiovascular:  Negative for chest pain.   Neurological:  Negative for headaches.       Objective     Vitals:    09/09/24 1352   BP: 114/58   BP Location: Right arm   Patient Position: Sitting   Pulse: 66   Resp: 18   Temp: 36.9 °C (98.4 °F)   TempSrc: Temporal   SpO2: 95%   Weight: 95.3 kg (210 lb)   Height: 1.727 m (5' 8\")        Current Outpatient Medications   Medication Instructions    atorvastatin (Lipitor) 10 mg tablet TAKE 1 TABLET BY MOUTH ON MONDAYS, WEDNESDAYS AND FRIDAYS    carvedilol (COREG) 3.125 mg, oral, Daily    cholecalciferol (Vitamin D3) 50 mcg (2,000 unit) capsule oral,  TAKE ONE TABLET DAILY ONLY IN WINTER    lisinopril 2.5 mg, oral, Daily    methylphenidate (RITALIN) 10 mg, oral, 2 times daily    multivitamin (MULTIPLE VITAMINS ORAL) Orally    spironolactone (ALDACTONE) 25 mg, oral, Daily    " torsemide (DEMADEX) 20 mg, oral, 2 times weekly,  TAKE ONE TABLET AS NEEDED FOR SWELLING    vit A/vit C/vit E/zinc/copper (PRESERVISION AREDS ORAL) 1 capsule, oral, 2 times daily        Allergies   Allergen Reactions    Oxycodone Hcl-Oxycodone-Asa Nausea Only and Other     Vomiting      Oxycodone-Aspirin GI Upset and Nausea/vomiting        Past Surgical History:   Procedure Laterality Date    OTHER SURGICAL HISTORY  12/30/2019    Tonsillectomy    OTHER SURGICAL HISTORY  12/30/2019    Umbilical hernia repair    OTHER SURGICAL HISTORY  12/30/2019    Bariatric surgery    OTHER SURGICAL HISTORY  04/12/2021    Coronary artery stent placement    OTHER SURGICAL HISTORY  07/15/2022    Sigmoidoscopy    OTHER SURGICAL HISTORY  01/06/2022    Mao-en-Y gastric bypass    OTHER SURGICAL HISTORY  01/06/2022    Facial surgery    OTHER SURGICAL HISTORY  01/06/2022    Hernia repair    OTHER SURGICAL HISTORY  01/06/2022    Cataract surgery    OTHER SURGICAL HISTORY  01/06/2022    Anal fissurectomy    OTHER SURGICAL HISTORY  01/06/2022    Percutaneous transluminal coronary angioplasty        Social History     Tobacco Use    Smoking status: Former     Types: Cigarettes    Smokeless tobacco: Never   Vaping Use    Vaping status: Never Used   Substance Use Topics    Alcohol use: Yes    Drug use: Not Currently        Social History     Substance and Sexual Activity   Alcohol Use Yes       Family History   Problem Relation Name Age of Onset    Hypertension Mother      Diabetes Father      Lung cancer Father      Lung cancer Brother          Immunization History   Administered Date(s) Administered    Flu vaccine, quadrivalent, high-dose, preservative free, age 65y+ (FLUZONE) 12/05/2021, 10/23/2022    Flu vaccine, trivalent, preservative free, HIGH-DOSE, age 65y+ (Fluzone) 12/01/2017, 10/25/2018, 12/16/2019, 09/09/2024    Influenza, Seasonal, Quadrivalent, Adjuvanted 09/29/2020, 10/06/2023    Influenza, Unspecified 10/01/2018, 10/01/2020     Influenza, seasonal, injectable 10/01/2019    Moderna COVID-19 vaccine, Fall 2023, 12 yeasrs and older (50mcg/0.5mL) 11/12/2023    Moderna SARS-CoV-2 Vaccination 01/07/2021, 02/04/2021, 11/07/2021    Pneumococcal conjugate vaccine, 13-valent (PREVNAR 13) 12/30/2019    Pneumococcal polysaccharide vaccine, 23-valent, age 2 years and older (PNEUMOVAX 23) 12/01/2017, 10/01/2018    Small pox and monkeypox vaccine (Jynneos) *check dose/route* 12/06/2023, 03/19/2024    Zoster vaccine, recombinant, adult (SHINGRIX) 10/04/2020, 03/05/2021        Physical Exam  Vitals reviewed.   Constitutional:       General: He is not in acute distress.     Appearance: Normal appearance. He is well-developed. He is obese.   HENT:      Head: Normocephalic and atraumatic.   Eyes:      General: Lids are normal.      Conjunctiva/sclera:      Right eye: Right conjunctiva is not injected.      Left eye: Left conjunctiva is not injected.   Cardiovascular:      Rate and Rhythm: Normal rate and regular rhythm.      Heart sounds: No murmur heard.  Pulmonary:      Effort: Pulmonary effort is normal. No respiratory distress.      Breath sounds: Normal breath sounds. No wheezing, rhonchi or rales.   Skin:     General: Skin is warm and dry.      Findings: No rash.   Neurological:      Mental Status: He is alert and oriented to person, place, and time. Mental status is at baseline.   Psychiatric:         Mood and Affect: Mood normal.         Behavior: Behavior normal.         Assessment & Plan  Type 2 diabetes mellitus without complication, without long-term current use of insulin (Multi)  DM II - diagnosed in 5/21/24 (A1c was 6.5% at that time) - his A1c today is 6.6%.  He has been trying to watch his carbs/sugars in his diet better.     Orders:    POCT glycosylated hemoglobin (Hb A1C) manually resulted  follow up 3-4 months   Coronary artery disease involving native coronary artery of native heart without angina pectoris  Hx of CAD, s/p PCI  , on  atorvastatin 10 mg daily  - no longer takes asa 81 mg daily due to bruising issues. He takes beta blocker, spironolactone -  sees cardiology, Dr. Clark.  Pt had a nuclear stress test on 7/31/24 - per cardiology , the stress test showed old MI with no new areas of ischemia (reassuring).         Hypertension, essential, benign  HTN - on spironolactone, coreg, lisinopril - controlled        Obstructive sleep apnea  Does not use cpap - cannot tolerate  Hypersomnia with sleep apnea - on methylphenidate and modafinil - managed by pulmonology       Class 1 obesity due to excess calories with serious comorbidity and body mass index (BMI) of 31.0 to 31.9 in adult  Obesity, BMI today ws 32 - Hx of bariatric sleeve surgery        Lung nodules  sees CT surgery, Dr. Monroy       Macular degeneration of left eye, unspecified type  Hx of macular degeneration - sees eye specialist        History of kidney stones  Sees  urology  Kidney stones         Flu vaccine need    Orders:    Flu vaccine, trivalent, preservative free, HIGH-DOSE, age 65y+ (Fluzone)     Flu vaccine given today

## 2024-09-16 ENCOUNTER — APPOINTMENT (OUTPATIENT)
Dept: PRIMARY CARE | Facility: CLINIC | Age: 84
End: 2024-09-16
Payer: COMMERCIAL

## 2024-09-25 DIAGNOSIS — I25.10 CORONARY ARTERY DISEASE INVOLVING NATIVE HEART, UNSPECIFIED VESSEL OR LESION TYPE, UNSPECIFIED WHETHER ANGINA PRESENT: Primary | ICD-10-CM

## 2024-09-27 RX ORDER — SPIRONOLACTONE 25 MG/1
TABLET ORAL
Qty: 45 TABLET | Refills: 3 | Status: SHIPPED | OUTPATIENT
Start: 2024-09-27

## 2024-10-14 ENCOUNTER — OFFICE VISIT (OUTPATIENT)
Dept: PRIMARY CARE | Facility: CLINIC | Age: 84
End: 2024-10-14
Payer: COMMERCIAL

## 2024-10-14 ENCOUNTER — TELEPHONE (OUTPATIENT)
Dept: PRIMARY CARE | Facility: CLINIC | Age: 84
End: 2024-10-14

## 2024-10-14 VITALS
RESPIRATION RATE: 18 BRPM | WEIGHT: 213 LBS | SYSTOLIC BLOOD PRESSURE: 123 MMHG | BODY MASS INDEX: 32.39 KG/M2 | HEART RATE: 78 BPM | OXYGEN SATURATION: 94 % | DIASTOLIC BLOOD PRESSURE: 72 MMHG | TEMPERATURE: 96.7 F

## 2024-10-14 DIAGNOSIS — J20.9 ACUTE BRONCHITIS, UNSPECIFIED ORGANISM: Primary | ICD-10-CM

## 2024-10-14 PROCEDURE — 1123F ACP DISCUSS/DSCN MKR DOCD: CPT | Performed by: FAMILY MEDICINE

## 2024-10-14 PROCEDURE — 3074F SYST BP LT 130 MM HG: CPT | Performed by: FAMILY MEDICINE

## 2024-10-14 PROCEDURE — 1159F MED LIST DOCD IN RCRD: CPT | Performed by: FAMILY MEDICINE

## 2024-10-14 PROCEDURE — 99213 OFFICE O/P EST LOW 20 MIN: CPT | Performed by: FAMILY MEDICINE

## 2024-10-14 PROCEDURE — 3078F DIAST BP <80 MM HG: CPT | Performed by: FAMILY MEDICINE

## 2024-10-14 PROCEDURE — 1036F TOBACCO NON-USER: CPT | Performed by: FAMILY MEDICINE

## 2024-10-14 RX ORDER — AZITHROMYCIN 250 MG/1
TABLET, FILM COATED ORAL
Qty: 6 TABLET | Refills: 0 | Status: SHIPPED | OUTPATIENT
Start: 2024-10-14 | End: 2024-10-18

## 2024-10-14 RX ORDER — BENZONATATE 200 MG/1
200 CAPSULE ORAL 3 TIMES DAILY PRN
Qty: 21 CAPSULE | Refills: 0 | Status: SHIPPED | OUTPATIENT
Start: 2024-10-14 | End: 2024-10-21

## 2024-10-14 ASSESSMENT — ENCOUNTER SYMPTOMS
SHORTNESS OF BREATH: 0
HEADACHES: 0
COUGH: 1

## 2024-10-14 NOTE — TELEPHONE ENCOUNTER
Pt called and stated he has had a cough for about 3 weeks. Pt states he is not experiencing sore throat, fever, or runny nose. Pt would like to know if he could be seen today pt states he did not test for COVID and was advised to before being seen

## 2024-10-14 NOTE — PROGRESS NOTES
Subjective     Ry Aguirre is a 83 y.o. male who presents for Cough.    Cough  Pertinent negatives include no chest pain, headaches or shortness of breath.        Pt reports persistent productive cough x 3 weeks .  He denies fevers, chills, body aches, nasal congestion, trouble breathing, sore throat, headache.  No wheezing.  Nonsmoker.  No hx of asthma.  Pt did not do a rapid covid test.      Review of Systems   Respiratory:  Positive for cough. Negative for shortness of breath.    Cardiovascular:  Negative for chest pain.   Neurological:  Negative for headaches.       Objective     Vitals:    10/14/24 1258   BP: 123/72   BP Location: Right arm   Patient Position: Sitting   Pulse: 78   Resp: 18   Temp: 35.9 °C (96.7 °F)   TempSrc: Temporal   SpO2: 94%   Weight: 96.6 kg (213 lb)        Current Outpatient Medications   Medication Instructions   • atorvastatin (Lipitor) 10 mg tablet TAKE 1 TABLET BY MOUTH ON MONDAYS, WEDNESDAYS AND FRIDAYS   • azithromycin (Zithromax) 250 mg tablet Take 2 tablets (500 mg) by mouth once daily for 1 day, THEN 1 tablet (250 mg) once daily for 4 days.   • benzonatate (TESSALON) 200 mg, oral, 3 times daily PRN, Do not crush or chew.    • carvedilol (COREG) 3.125 mg, oral, Daily   • cholecalciferol (Vitamin D3) 50 mcg (2,000 unit) capsule oral,  TAKE ONE TABLET DAILY ONLY IN WINTER   • lisinopril 2.5 mg, oral, Daily   • methylphenidate (RITALIN) 10 mg, oral, 2 times daily   • multivitamin (MULTIPLE VITAMINS ORAL) Orally   • spironolactone (Aldactone) 25 mg tablet TAKE 1 TABLET MONDAYS, WEDNESDAYS AND FRIDAYS   • torsemide (DEMADEX) 20 mg, oral, 2 times weekly,  TAKE ONE TABLET AS NEEDED FOR SWELLING   • vit A/vit C/vit E/zinc/copper (PRESERVISION AREDS ORAL) 1 capsule, oral, 2 times daily        Allergies   Allergen Reactions   • Oxycodone Hcl-Oxycodone-Asa Nausea Only and Other     Vomiting     • Oxycodone-Aspirin GI Upset and Nausea/vomiting        Past Surgical History:    Procedure Laterality Date   • OTHER SURGICAL HISTORY  12/30/2019    Tonsillectomy   • OTHER SURGICAL HISTORY  12/30/2019    Umbilical hernia repair   • OTHER SURGICAL HISTORY  12/30/2019    Bariatric surgery   • OTHER SURGICAL HISTORY  04/12/2021    Coronary artery stent placement   • OTHER SURGICAL HISTORY  07/15/2022    Sigmoidoscopy   • OTHER SURGICAL HISTORY  01/06/2022    Mao-en-Y gastric bypass   • OTHER SURGICAL HISTORY  01/06/2022    Facial surgery   • OTHER SURGICAL HISTORY  01/06/2022    Hernia repair   • OTHER SURGICAL HISTORY  01/06/2022    Cataract surgery   • OTHER SURGICAL HISTORY  01/06/2022    Anal fissurectomy   • OTHER SURGICAL HISTORY  01/06/2022    Percutaneous transluminal coronary angioplasty        Social History     Tobacco Use   • Smoking status: Former     Types: Cigarettes   • Smokeless tobacco: Never   Vaping Use   • Vaping status: Never Used   Substance Use Topics   • Alcohol use: Yes   • Drug use: Not Currently        Family History   Problem Relation Name Age of Onset   • Hypertension Mother     • Diabetes Father     • Lung cancer Father     • Lung cancer Brother          Immunization History   Administered Date(s) Administered   • Flu vaccine, quadrivalent, high-dose, preservative free, age 65y+ (FLUZONE) 12/05/2021, 10/23/2022   • Flu vaccine, trivalent, preservative free, HIGH-DOSE, age 65y+ (Fluzone) 12/01/2017, 10/25/2018, 12/16/2019, 09/09/2024   • Influenza, Seasonal, Quadrivalent, Adjuvanted 09/29/2020, 10/06/2023   • Influenza, Unspecified 10/01/2018, 10/01/2020   • Influenza, seasonal, injectable 10/01/2019   • Moderna COVID-19 vaccine, 12 years and older (50mcg/0.5mL)(Spikevax) 11/12/2023   • Moderna SARS-CoV-2 Vaccination 01/07/2021, 02/04/2021, 11/07/2021   • Pneumococcal conjugate vaccine, 13-valent (PREVNAR 13) 12/30/2019   • Pneumococcal polysaccharide vaccine, 23-valent, age 2 years and older (PNEUMOVAX 23) 12/01/2017, 10/01/2018   • Small pox and monkeypox vaccine  (Jynneos) *check dose/route* 12/06/2023, 03/19/2024   • Zoster vaccine, recombinant, adult (SHINGRIX) 10/04/2020, 03/05/2021        Physical Exam  Constitutional:       General: He is not in acute distress.     Appearance: He is not toxic-appearing.   HENT:      Head: Normocephalic and atraumatic.      Right Ear: Tympanic membrane, ear canal and external ear normal.      Left Ear: Tympanic membrane, ear canal and external ear normal.      Nose: No congestion or rhinorrhea.      Mouth/Throat:      Mouth: Mucous membranes are moist.      Pharynx: Oropharynx is clear. No oropharyngeal exudate or posterior oropharyngeal erythema.   Eyes:      Conjunctiva/sclera: Conjunctivae normal.   Cardiovascular:      Rate and Rhythm: Normal rate and regular rhythm.   Pulmonary:      Effort: Pulmonary effort is normal. No respiratory distress.      Breath sounds: No wheezing, rhonchi or rales.      Comments: Mildly coarse breath sounds   Lymphadenopathy:      Cervical: No cervical adenopathy.   Skin:     General: Skin is warm and dry.      Findings: No rash.   Neurological:      Mental Status: He is alert and oriented to person, place, and time. Mental status is at baseline.   Psychiatric:         Mood and Affect: Mood normal.         Behavior: Behavior normal.       Assessment & Plan  Acute bronchitis, unspecified organism  X 3 weeks, will treat with zpak, tessalon, Follow up if no improvement or if symptoms worsen.  Proceed to the nearest emergency department if condition worsens significantly/becomes severe in nature.     Orders:  •  azithromycin (Zithromax) 250 mg tablet; Take 2 tablets (500 mg) by mouth once daily for 1 day, THEN 1 tablet (250 mg) once daily for 4 days.  •  benzonatate (Tessalon) 200 mg capsule; Take 1 capsule (200 mg) by mouth 3 times a day as needed for cough for up to 7 days. Do not crush or chew.

## 2024-10-17 ENCOUNTER — APPOINTMENT (OUTPATIENT)
Dept: CARDIOLOGY | Facility: CLINIC | Age: 84
End: 2024-10-17
Payer: COMMERCIAL

## 2024-10-17 VITALS
WEIGHT: 214 LBS | DIASTOLIC BLOOD PRESSURE: 73 MMHG | SYSTOLIC BLOOD PRESSURE: 120 MMHG | HEART RATE: 81 BPM | BODY MASS INDEX: 32.43 KG/M2 | HEIGHT: 68 IN

## 2024-10-17 DIAGNOSIS — I77.810 DILATED AORTIC ROOT (CMS-HCC): ICD-10-CM

## 2024-10-17 DIAGNOSIS — I73.9 PERIPHERAL VASCULAR DISEASE (CMS-HCC): ICD-10-CM

## 2024-10-17 DIAGNOSIS — I25.10 CORONARY ARTERY DISEASE INVOLVING NATIVE HEART, UNSPECIFIED VESSEL OR LESION TYPE, UNSPECIFIED WHETHER ANGINA PRESENT: ICD-10-CM

## 2024-10-17 DIAGNOSIS — I10 HYPERTENSION, ESSENTIAL, BENIGN: ICD-10-CM

## 2024-10-17 DIAGNOSIS — Z87.891 FORMER SMOKER: ICD-10-CM

## 2024-10-17 DIAGNOSIS — R07.89 OTHER CHEST PAIN: ICD-10-CM

## 2024-10-17 DIAGNOSIS — E78.2 MIXED HYPERLIPIDEMIA: ICD-10-CM

## 2024-10-17 PROCEDURE — 99214 OFFICE O/P EST MOD 30 MIN: CPT | Performed by: INTERNAL MEDICINE

## 2024-10-17 PROCEDURE — 1123F ACP DISCUSS/DSCN MKR DOCD: CPT | Performed by: INTERNAL MEDICINE

## 2024-10-17 PROCEDURE — 1036F TOBACCO NON-USER: CPT | Performed by: INTERNAL MEDICINE

## 2024-10-17 PROCEDURE — 1159F MED LIST DOCD IN RCRD: CPT | Performed by: INTERNAL MEDICINE

## 2024-10-17 PROCEDURE — 3074F SYST BP LT 130 MM HG: CPT | Performed by: INTERNAL MEDICINE

## 2024-10-17 PROCEDURE — 3078F DIAST BP <80 MM HG: CPT | Performed by: INTERNAL MEDICINE

## 2024-10-17 RX ORDER — NITROGLYCERIN 0.4 MG/1
0.4 TABLET SUBLINGUAL EVERY 5 MIN PRN
Qty: 100 TABLET | Refills: 0 | Status: SHIPPED | OUTPATIENT
Start: 2024-10-17 | End: 2025-01-15

## 2024-10-17 RX ORDER — ISOSORBIDE MONONITRATE 30 MG/1
15 TABLET, EXTENDED RELEASE ORAL DAILY
Qty: 45 TABLET | Refills: 1 | Status: SHIPPED | OUTPATIENT
Start: 2024-10-17 | End: 2025-04-15

## 2024-10-17 ASSESSMENT — ENCOUNTER SYMPTOMS
NEUROLOGICAL NEGATIVE: 1
CONSTITUTIONAL NEGATIVE: 1
RESPIRATORY NEGATIVE: 1
CARDIOVASCULAR NEGATIVE: 1

## 2024-10-17 NOTE — PROGRESS NOTES
CARDIOLOGY OFFICE VISIT      CHIEF COMPLAINT  Chief Complaint   Patient presents with    Follow-up     4 month follow up.         HISTORY OF PRESENT ILLNESS  Ry Aguirre is a 83 y.o. 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  His most recent echocardiogram in March 2023 shows normalized EF of about 60% with apical wall motion abnormalities that is unchanged.  His EF is improved from 45% on previous echocardiogram.  He describes intermittent feeling of doom with chest pressure that tends to occur in the night, lasting for about a minute and resolves by itself.  He subsequently had repeat stress test on 7/31/2024 which shows a large apical myocardial infarction with no independent ischemia.  His EF was reported to be 59%.  He said he felt somewhat better but still complains of this intermittent squeezing in the chest area which does not appear to be as severe and usually resolves by itself.    ASSESSMENT AND PLAN:  1.  CAD s/p LAD stenting for acute anterior myocardial infarct with repeat stress test as noted above for intermittent atypical chest pressure.  Although there is no significant ischemia on the repeat stress test, in view of his persistent symptoms, I have recommended repeat cardiac catheterization for reevaluation for any hemodynamically significant CAD.  In the meantime, I have started him on low-dose Imdur at 50 mg daily as well as as needed sublingual nitroglycerin.  Will follow-up based on the results of the cardiac catheterization.  2.   Hypertension: Blood pressure is well-controlled on his current medications which we will continue.  3.  Dilated ascending aorta: This is stable on recent echocardiogram at 3.9 cm.  4.  Dyslipidemia: With acceptable lipid profile as noted on his recent labs, we will continue with current lipid-lowering therapy.    Problem List Items Addressed This Visit             ICD-10-CM       Cardiac and  Vasculature    CAD (coronary artery disease) I25.10    Relevant Medications    isosorbide mononitrate ER (Imdur) 30 mg 24 hr tablet    nitroglycerin (Nitrostat) 0.4 mg SL tablet    Other Relevant Orders    Case Request Cath Lab: Left Heart Cath (Completed)    Dilated aortic root (CMS-[facility]) I77.810    Hypertension, essential, benign I10    Relevant Orders    CBC    Basic metabolic panel    Protime-INR    Mixed hyperlipidemia E78.2    Peripheral vascular disease (CMS-HCC) I73.9    Other chest pain R07.89    Relevant Medications    isosorbide mononitrate ER (Imdur) 30 mg 24 hr tablet    nitroglycerin (Nitrostat) 0.4 mg SL tablet    Other Relevant Orders    Case Request Cath Lab: Left Heart Cath (Completed)       Endocrine/Metabolic    Adult BMI 32.0-32.9 kg/sq m Z68.32       Tobacco    Former smoker Z87.891       Recent Cardiovascular Testing:    Echo-3/2023 CONCLUSIONS:  1. Left ventricular systolic function is normal with a 60-65% estimated ejection fraction.  2. [place] is abnormal.  3. Spectral Doppler shows an impaired relaxation pattern of left ventricular diastolic filling.  4. There is no evidence of cardiac tamponade.  5. No evidence of mitral valve prolapse.  6. There is No tricuspid stenosis.  7. RVSP within normal limits.  8. Aortic valve stenosis is not present.  9. Mild aortic valve regurgitation.  10. Compared with echo 6/2019 LVEF has improved.  Stress-  Cath-  Carotid Ultrasound-    Past Medical History  History reviewed. No pertinent past medical history.    Social History  Social History     Tobacco Use    Smoking status: Former     Types: Cigarettes    Smokeless tobacco: Never   Vaping Use    Vaping status: Never Used   Substance Use Topics    Alcohol use: Yes    Drug use: Not Currently       Family History     Family History   Problem Relation Name Age of Onset    Hypertension Mother      Diabetes Father      Lung cancer Father      Lung cancer Brother          Allergies:  Allergies   Allergen  "Reactions    Oxycodone Hcl-Oxycodone-Asa Nausea Only and Other     Vomiting      Oxycodone-Aspirin GI Upset and Nausea/vomiting        Outpatient Medications:  Current Outpatient Medications   Medication Instructions    atorvastatin (Lipitor) 10 mg tablet TAKE 1 TABLET BY MOUTH ON MONDAYS, WEDNESDAYS AND FRIDAYS    azithromycin (Zithromax) 250 mg tablet Take 2 tablets (500 mg) by mouth once daily for 1 day, THEN 1 tablet (250 mg) once daily for 4 days.    benzonatate (TESSALON) 200 mg, oral, 3 times daily PRN, Do not crush or chew.     carvedilol (COREG) 3.125 mg, oral, Daily    cholecalciferol (Vitamin D3) 50 mcg (2,000 unit) capsule Take by mouth.  TAKE ONE TABLET DAILY ONLY IN WINTER    isosorbide mononitrate ER (IMDUR) 15 mg, oral, Daily, Do not crush or chew.    lisinopril 2.5 mg, Daily    methylphenidate (RITALIN) 10 mg, 2 times daily    multivitamin (MULTIPLE VITAMINS ORAL) Orally    nitroglycerin (NITROSTAT) 0.4 mg, sublingual, Every 5 min PRN, May repeat dose every 5 minutes for up to 3 doses total.    spironolactone (Aldactone) 25 mg tablet TAKE 1 TABLET MONDAYS, WEDNESDAYS AND FRIDAYS    torsemide (DEMADEX) 20 mg, 2 times weekly    vit A/vit C/vit E/zinc/copper (PRESERVISION AREDS ORAL) 1 capsule, 2 times daily        Recent Lab Results:    CBC:   Lab Results   Component Value Date    WBC 5.8 05/21/2024    RBC 4.37 (L) 05/21/2024    HGB 14.0 05/21/2024    HCT 43.2 05/21/2024     05/21/2024        CMP:    Lab Results   Component Value Date     05/21/2024    K 4.1 05/21/2024     05/21/2024    CO2 31 05/21/2024    BUN 20 05/21/2024    CREATININE 0.90 05/21/2024    GLUCOSE 103 (H) 05/21/2024    CALCIUM 9.2 05/21/2024       Magnesium:    No results found for: \"MG\"    Lipid Profile:    Lab Results   Component Value Date    TRIG 125 05/21/2024    HDL 38.6 05/21/2024    LDLCALC 83 05/21/2024       TSH:    No results found for: \"TSH\"    BNP:   No results found for: \"BNP\"     PT/INR:    No " "results found for: \"PROTIME\", \"INR\"    HgBA1c:    Lab Results   Component Value Date    HGBA1C 6.6 (A) 09/09/2024       BMP:  Lab Results   Component Value Date     05/21/2024     07/15/2022     04/12/2021     07/30/2020    K 4.1 05/21/2024    K 4.3 07/15/2022    K 4.3 04/12/2021    K 3.8 07/30/2020     05/21/2024     07/15/2022     04/12/2021     07/30/2020    CO2 31 05/21/2024    CO2 33 (H) 07/15/2022    CO2 33 (H) 04/12/2021    CO2 32 07/30/2020    BUN 20 05/21/2024    BUN 18 07/15/2022    BUN 21 04/12/2021    BUN 19 07/30/2020    CREATININE 0.90 05/21/2024    CREATININE 0.89 07/15/2022    CREATININE 1.06 04/12/2021    CREATININE 0.99 07/30/2020       CBC:  Lab Results   Component Value Date    WBC 5.8 05/21/2024    WBC 6.0 07/15/2022    WBC 6.1 04/12/2021    WBC 8.3 03/04/2020    RBC 4.37 (L) 05/21/2024    RBC 4.27 (L) 07/15/2022    RBC 4.29 (L) 04/12/2021    RBC 4.11 (L) 03/04/2020    HGB 14.0 05/21/2024    HGB 13.6 07/15/2022    HGB 13.9 04/12/2021    HGB 13.0 (L) 03/04/2020    HCT 43.2 05/21/2024    HCT 41.8 07/15/2022    HCT 42.5 04/12/2021    HCT 39.1 (L) 03/04/2020    MCV 99 05/21/2024    MCV 98 07/15/2022    MCV 99 04/12/2021    MCV 95 03/04/2020    MCH 32.0 05/21/2024    MCHC 32.4 05/21/2024    MCHC 32.5 07/15/2022    MCHC 32.7 04/12/2021    MCHC 33.2 03/04/2020    RDW 12.4 05/21/2024    RDW 11.9 07/15/2022    RDW 11.9 04/12/2021    RDW 12.3 03/04/2020     05/21/2024     07/15/2022     04/12/2021     03/04/2020       Cardiac Enzymes:    No results found for: \"TROPHS\"    Hepatic Function Panel:    Lab Results   Component Value Date    ALKPHOS 65 05/21/2024    ALT 9 (L) 05/21/2024    AST 13 05/21/2024    PROT 6.1 (L) 05/21/2024    BILITOT 0.7 05/21/2024    BILIDIR 0.1 03/04/2021         REVIEW OF SYSTEMS  Review of Systems   Constitutional: Negative.   Cardiovascular: Negative.    Respiratory: Negative.     Neurological: Negative.  "       VITALS  Vitals:    10/17/24 1440   BP: 120/73   Pulse: 81     Wt Readings from Last 4 Encounters:   10/17/24 97.1 kg (214 lb)   10/14/24 96.6 kg (213 lb)   09/09/24 95.3 kg (210 lb)   06/27/24 96.5 kg (212 lb 12.8 oz)       PHYSICAL EXAM  Vitals reviewed.   Constitutional:       Appearance: Healthy appearance.      Comments: obese   Neck:      Thyroid: Thyroid normal.      Vascular: JVD normal.   Pulmonary:      Effort: Pulmonary effort is normal.      Breath sounds: Normal breath sounds.   Cardiovascular:      Normal rate. Regular rhythm.      Murmurs: There is no murmur.   Edema:     Peripheral edema absent.   Musculoskeletal: Normal range of motion.      Cervical back: Normal range of motion. Skin:     General: Skin is warm and dry.   Neurological:      General: No focal deficit present.      Mental Status: Alert and oriented to person, place and time.         Scribe Attestation  By signing my name below, I, Cm Clark MD  , Scribe   attest that this documentation has been prepared under the direction and in the presence of Cm Clark MD.

## 2024-10-17 NOTE — PATIENT INSTRUCTIONS
Schedule Heart Cath with DR Kearney  Begin taking IMDUR 15 mg daily  Take Nitroglycerin as needed for chest pain  See Dr Clark after heart cath

## 2024-10-17 NOTE — H&P (VIEW-ONLY)
CARDIOLOGY OFFICE VISIT      CHIEF COMPLAINT  Chief Complaint   Patient presents with    Follow-up     4 month follow up.         HISTORY OF PRESENT ILLNESS  Ry Aguirre is a 83 y.o. 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  His most recent echocardiogram in March 2023 shows normalized EF of about 60% with apical wall motion abnormalities that is unchanged.  His EF is improved from 45% on previous echocardiogram.  He describes intermittent feeling of doom with chest pressure that tends to occur in the night, lasting for about a minute and resolves by itself.  He subsequently had repeat stress test on 7/31/2024 which shows a large apical myocardial infarction with no independent ischemia.  His EF was reported to be 59%.  He said he felt somewhat better but still complains of this intermittent squeezing in the chest area which does not appear to be as severe and usually resolves by itself.    ASSESSMENT AND PLAN:  1.  CAD s/p LAD stenting for acute anterior myocardial infarct with repeat stress test as noted above for intermittent atypical chest pressure.  Although there is no significant ischemia on the repeat stress test, in view of his persistent symptoms, I have recommended repeat cardiac catheterization for reevaluation for any hemodynamically significant CAD.  In the meantime, I have started him on low-dose Imdur at 50 mg daily as well as as needed sublingual nitroglycerin.  Will follow-up based on the results of the cardiac catheterization.  2.   Hypertension: Blood pressure is well-controlled on his current medications which we will continue.  3.  Dilated ascending aorta: This is stable on recent echocardiogram at 3.9 cm.  4.  Dyslipidemia: With acceptable lipid profile as noted on his recent labs, we will continue with current lipid-lowering therapy.    Problem List Items Addressed This Visit             ICD-10-CM       Cardiac and  Vasculature    CAD (coronary artery disease) I25.10    Relevant Medications    isosorbide mononitrate ER (Imdur) 30 mg 24 hr tablet    nitroglycerin (Nitrostat) 0.4 mg SL tablet    Other Relevant Orders    Case Request Cath Lab: Left Heart Cath (Completed)    Dilated aortic root (CMS-[facility]) I77.810    Hypertension, essential, benign I10    Relevant Orders    CBC    Basic metabolic panel    Protime-INR    Mixed hyperlipidemia E78.2    Peripheral vascular disease (CMS-HCC) I73.9    Other chest pain R07.89    Relevant Medications    isosorbide mononitrate ER (Imdur) 30 mg 24 hr tablet    nitroglycerin (Nitrostat) 0.4 mg SL tablet    Other Relevant Orders    Case Request Cath Lab: Left Heart Cath (Completed)       Endocrine/Metabolic    Adult BMI 32.0-32.9 kg/sq m Z68.32       Tobacco    Former smoker Z87.891       Recent Cardiovascular Testing:    Echo-3/2023 CONCLUSIONS:  1. Left ventricular systolic function is normal with a 60-65% estimated ejection fraction.  2. [place] is abnormal.  3. Spectral Doppler shows an impaired relaxation pattern of left ventricular diastolic filling.  4. There is no evidence of cardiac tamponade.  5. No evidence of mitral valve prolapse.  6. There is No tricuspid stenosis.  7. RVSP within normal limits.  8. Aortic valve stenosis is not present.  9. Mild aortic valve regurgitation.  10. Compared with echo 6/2019 LVEF has improved.  Stress-  Cath-  Carotid Ultrasound-    Past Medical History  History reviewed. No pertinent past medical history.    Social History  Social History     Tobacco Use    Smoking status: Former     Types: Cigarettes    Smokeless tobacco: Never   Vaping Use    Vaping status: Never Used   Substance Use Topics    Alcohol use: Yes    Drug use: Not Currently       Family History     Family History   Problem Relation Name Age of Onset    Hypertension Mother      Diabetes Father      Lung cancer Father      Lung cancer Brother          Allergies:  Allergies   Allergen  "Reactions    Oxycodone Hcl-Oxycodone-Asa Nausea Only and Other     Vomiting      Oxycodone-Aspirin GI Upset and Nausea/vomiting        Outpatient Medications:  Current Outpatient Medications   Medication Instructions    atorvastatin (Lipitor) 10 mg tablet TAKE 1 TABLET BY MOUTH ON MONDAYS, WEDNESDAYS AND FRIDAYS    azithromycin (Zithromax) 250 mg tablet Take 2 tablets (500 mg) by mouth once daily for 1 day, THEN 1 tablet (250 mg) once daily for 4 days.    benzonatate (TESSALON) 200 mg, oral, 3 times daily PRN, Do not crush or chew.     carvedilol (COREG) 3.125 mg, oral, Daily    cholecalciferol (Vitamin D3) 50 mcg (2,000 unit) capsule Take by mouth.  TAKE ONE TABLET DAILY ONLY IN WINTER    isosorbide mononitrate ER (IMDUR) 15 mg, oral, Daily, Do not crush or chew.    lisinopril 2.5 mg, Daily    methylphenidate (RITALIN) 10 mg, 2 times daily    multivitamin (MULTIPLE VITAMINS ORAL) Orally    nitroglycerin (NITROSTAT) 0.4 mg, sublingual, Every 5 min PRN, May repeat dose every 5 minutes for up to 3 doses total.    spironolactone (Aldactone) 25 mg tablet TAKE 1 TABLET MONDAYS, WEDNESDAYS AND FRIDAYS    torsemide (DEMADEX) 20 mg, 2 times weekly    vit A/vit C/vit E/zinc/copper (PRESERVISION AREDS ORAL) 1 capsule, 2 times daily        Recent Lab Results:    CBC:   Lab Results   Component Value Date    WBC 5.8 05/21/2024    RBC 4.37 (L) 05/21/2024    HGB 14.0 05/21/2024    HCT 43.2 05/21/2024     05/21/2024        CMP:    Lab Results   Component Value Date     05/21/2024    K 4.1 05/21/2024     05/21/2024    CO2 31 05/21/2024    BUN 20 05/21/2024    CREATININE 0.90 05/21/2024    GLUCOSE 103 (H) 05/21/2024    CALCIUM 9.2 05/21/2024       Magnesium:    No results found for: \"MG\"    Lipid Profile:    Lab Results   Component Value Date    TRIG 125 05/21/2024    HDL 38.6 05/21/2024    LDLCALC 83 05/21/2024       TSH:    No results found for: \"TSH\"    BNP:   No results found for: \"BNP\"     PT/INR:    No " "results found for: \"PROTIME\", \"INR\"    HgBA1c:    Lab Results   Component Value Date    HGBA1C 6.6 (A) 09/09/2024       BMP:  Lab Results   Component Value Date     05/21/2024     07/15/2022     04/12/2021     07/30/2020    K 4.1 05/21/2024    K 4.3 07/15/2022    K 4.3 04/12/2021    K 3.8 07/30/2020     05/21/2024     07/15/2022     04/12/2021     07/30/2020    CO2 31 05/21/2024    CO2 33 (H) 07/15/2022    CO2 33 (H) 04/12/2021    CO2 32 07/30/2020    BUN 20 05/21/2024    BUN 18 07/15/2022    BUN 21 04/12/2021    BUN 19 07/30/2020    CREATININE 0.90 05/21/2024    CREATININE 0.89 07/15/2022    CREATININE 1.06 04/12/2021    CREATININE 0.99 07/30/2020       CBC:  Lab Results   Component Value Date    WBC 5.8 05/21/2024    WBC 6.0 07/15/2022    WBC 6.1 04/12/2021    WBC 8.3 03/04/2020    RBC 4.37 (L) 05/21/2024    RBC 4.27 (L) 07/15/2022    RBC 4.29 (L) 04/12/2021    RBC 4.11 (L) 03/04/2020    HGB 14.0 05/21/2024    HGB 13.6 07/15/2022    HGB 13.9 04/12/2021    HGB 13.0 (L) 03/04/2020    HCT 43.2 05/21/2024    HCT 41.8 07/15/2022    HCT 42.5 04/12/2021    HCT 39.1 (L) 03/04/2020    MCV 99 05/21/2024    MCV 98 07/15/2022    MCV 99 04/12/2021    MCV 95 03/04/2020    MCH 32.0 05/21/2024    MCHC 32.4 05/21/2024    MCHC 32.5 07/15/2022    MCHC 32.7 04/12/2021    MCHC 33.2 03/04/2020    RDW 12.4 05/21/2024    RDW 11.9 07/15/2022    RDW 11.9 04/12/2021    RDW 12.3 03/04/2020     05/21/2024     07/15/2022     04/12/2021     03/04/2020       Cardiac Enzymes:    No results found for: \"TROPHS\"    Hepatic Function Panel:    Lab Results   Component Value Date    ALKPHOS 65 05/21/2024    ALT 9 (L) 05/21/2024    AST 13 05/21/2024    PROT 6.1 (L) 05/21/2024    BILITOT 0.7 05/21/2024    BILIDIR 0.1 03/04/2021         REVIEW OF SYSTEMS  Review of Systems   Constitutional: Negative.   Cardiovascular: Negative.    Respiratory: Negative.     Neurological: Negative.  "       VITALS  Vitals:    10/17/24 1440   BP: 120/73   Pulse: 81     Wt Readings from Last 4 Encounters:   10/17/24 97.1 kg (214 lb)   10/14/24 96.6 kg (213 lb)   09/09/24 95.3 kg (210 lb)   06/27/24 96.5 kg (212 lb 12.8 oz)       PHYSICAL EXAM  Vitals reviewed.   Constitutional:       Appearance: Healthy appearance.      Comments: obese   Neck:      Thyroid: Thyroid normal.      Vascular: JVD normal.   Pulmonary:      Effort: Pulmonary effort is normal.      Breath sounds: Normal breath sounds.   Cardiovascular:      Normal rate. Regular rhythm.      Murmurs: There is no murmur.   Edema:     Peripheral edema absent.   Musculoskeletal: Normal range of motion.      Cervical back: Normal range of motion. Skin:     General: Skin is warm and dry.   Neurological:      General: No focal deficit present.      Mental Status: Alert and oriented to person, place and time.         Scribe Attestation  By signing my name below, I, Cm Clark MD  , Scribe   attest that this documentation has been prepared under the direction and in the presence of Cm Clark MD.

## 2024-10-24 ENCOUNTER — APPOINTMENT (OUTPATIENT)
Dept: CARDIOLOGY | Facility: CLINIC | Age: 84
End: 2024-10-24
Payer: COMMERCIAL

## 2024-10-29 ENCOUNTER — TELEPHONE (OUTPATIENT)
Dept: PRIMARY CARE | Facility: CLINIC | Age: 84
End: 2024-10-29
Payer: COMMERCIAL

## 2024-11-01 ENCOUNTER — OFFICE VISIT (OUTPATIENT)
Dept: PRIMARY CARE | Facility: CLINIC | Age: 84
End: 2024-11-01
Payer: COMMERCIAL

## 2024-11-01 ENCOUNTER — HOSPITAL ENCOUNTER (OUTPATIENT)
Dept: RADIOLOGY | Facility: CLINIC | Age: 84
Discharge: HOME | End: 2024-11-01
Payer: COMMERCIAL

## 2024-11-01 VITALS
WEIGHT: 214 LBS | TEMPERATURE: 97.9 F | SYSTOLIC BLOOD PRESSURE: 116 MMHG | HEART RATE: 72 BPM | DIASTOLIC BLOOD PRESSURE: 68 MMHG | RESPIRATION RATE: 18 BRPM | OXYGEN SATURATION: 95 % | BODY MASS INDEX: 32.54 KG/M2

## 2024-11-01 DIAGNOSIS — J40 BRONCHITIS: ICD-10-CM

## 2024-11-01 DIAGNOSIS — R05.3 COUGH, PERSISTENT: Primary | ICD-10-CM

## 2024-11-01 DIAGNOSIS — R05.3 COUGH, PERSISTENT: ICD-10-CM

## 2024-11-01 PROCEDURE — 1123F ACP DISCUSS/DSCN MKR DOCD: CPT | Performed by: FAMILY MEDICINE

## 2024-11-01 PROCEDURE — 87636 SARSCOV2 & INF A&B AMP PRB: CPT

## 2024-11-01 PROCEDURE — 1159F MED LIST DOCD IN RCRD: CPT | Performed by: FAMILY MEDICINE

## 2024-11-01 PROCEDURE — 71046 X-RAY EXAM CHEST 2 VIEWS: CPT

## 2024-11-01 PROCEDURE — 3074F SYST BP LT 130 MM HG: CPT | Performed by: FAMILY MEDICINE

## 2024-11-01 PROCEDURE — 87634 RSV DNA/RNA AMP PROBE: CPT

## 2024-11-01 PROCEDURE — 3078F DIAST BP <80 MM HG: CPT | Performed by: FAMILY MEDICINE

## 2024-11-01 PROCEDURE — 99213 OFFICE O/P EST LOW 20 MIN: CPT | Performed by: FAMILY MEDICINE

## 2024-11-01 PROCEDURE — 1036F TOBACCO NON-USER: CPT | Performed by: FAMILY MEDICINE

## 2024-11-01 RX ORDER — CEFDINIR 300 MG/1
300 CAPSULE ORAL 2 TIMES DAILY
Qty: 20 CAPSULE | Refills: 0 | Status: SHIPPED | OUTPATIENT
Start: 2024-11-01 | End: 2024-11-11

## 2024-11-01 RX ORDER — METHYLPREDNISOLONE 4 MG/1
TABLET ORAL
Qty: 21 TABLET | Refills: 0 | Status: SHIPPED | OUTPATIENT
Start: 2024-11-01 | End: 2024-11-08

## 2024-11-01 ASSESSMENT — ENCOUNTER SYMPTOMS: COUGH: 1

## 2024-11-02 LAB
FLUAV RNA RESP QL NAA+PROBE: NOT DETECTED
FLUBV RNA RESP QL NAA+PROBE: NOT DETECTED
RSV RNA RESP QL NAA+PROBE: NOT DETECTED
SARS-COV-2 RNA RESP QL NAA+PROBE: NOT DETECTED

## 2024-11-04 ENCOUNTER — TELEPHONE (OUTPATIENT)
Dept: PRIMARY CARE | Facility: CLINIC | Age: 84
End: 2024-11-04
Payer: COMMERCIAL

## 2024-11-04 NOTE — TELEPHONE ENCOUNTER
Per pt, he stopped coughing the day after he was prescribed the antibiotic and the steroid. He is asking if he still needs to take them. He didn't start any meds.

## 2024-11-08 ENCOUNTER — TELEPHONE (OUTPATIENT)
Dept: PRIMARY CARE | Facility: CLINIC | Age: 84
End: 2024-11-08
Payer: COMMERCIAL

## 2024-11-08 NOTE — TELEPHONE ENCOUNTER
I called patient per Dr Eddy in office and told him to take his  isosorbide a.m. day of heart cath. ..

## 2024-11-13 ENCOUNTER — HOSPITAL ENCOUNTER (OUTPATIENT)
Facility: HOSPITAL | Age: 84
Setting detail: OUTPATIENT SURGERY
Discharge: HOME | End: 2024-11-13
Attending: INTERNAL MEDICINE | Admitting: INTERNAL MEDICINE
Payer: COMMERCIAL

## 2024-11-13 VITALS
OXYGEN SATURATION: 96 % | BODY MASS INDEX: 31.07 KG/M2 | WEIGHT: 205 LBS | TEMPERATURE: 96.8 F | DIASTOLIC BLOOD PRESSURE: 72 MMHG | SYSTOLIC BLOOD PRESSURE: 141 MMHG | RESPIRATION RATE: 16 BRPM | HEART RATE: 73 BPM | HEIGHT: 68 IN

## 2024-11-13 DIAGNOSIS — R05.3 COUGH, PERSISTENT: ICD-10-CM

## 2024-11-13 DIAGNOSIS — I25.10 CORONARY ARTERY DISEASE INVOLVING NATIVE HEART, UNSPECIFIED VESSEL OR LESION TYPE, UNSPECIFIED WHETHER ANGINA PRESENT: ICD-10-CM

## 2024-11-13 DIAGNOSIS — R93.1 ABNORMAL FINDINGS ON DIAGNOSTIC IMAGING OF HEART AND CORONARY CIRCULATION: ICD-10-CM

## 2024-11-13 DIAGNOSIS — R07.89 OTHER CHEST PAIN: ICD-10-CM

## 2024-11-13 DIAGNOSIS — J40 BRONCHITIS: ICD-10-CM

## 2024-11-13 LAB
ANION GAP SERPL CALC-SCNC: 10 MMOL/L (ref 10–20)
BUN SERPL-MCNC: 17 MG/DL (ref 6–23)
CALCIUM SERPL-MCNC: 9 MG/DL (ref 8.6–10.3)
CHLORIDE SERPL-SCNC: 103 MMOL/L (ref 98–107)
CO2 SERPL-SCNC: 32 MMOL/L (ref 21–32)
CREAT SERPL-MCNC: 0.75 MG/DL (ref 0.5–1.3)
EGFRCR SERPLBLD CKD-EPI 2021: 88 ML/MIN/1.73M*2
ERYTHROCYTE [DISTWIDTH] IN BLOOD BY AUTOMATED COUNT: 12.3 % (ref 11.5–14.5)
GLUCOSE SERPL-MCNC: 125 MG/DL (ref 74–99)
HCT VFR BLD AUTO: 41.7 % (ref 41–52)
HGB BLD-MCNC: 13.7 G/DL (ref 13.5–17.5)
MCH RBC QN AUTO: 31.9 PG (ref 26–34)
MCHC RBC AUTO-ENTMCNC: 32.9 G/DL (ref 32–36)
MCV RBC AUTO: 97 FL (ref 80–100)
NRBC BLD-RTO: 0 /100 WBCS (ref 0–0)
PLATELET # BLD AUTO: 218 X10*3/UL (ref 150–450)
POTASSIUM SERPL-SCNC: 4.1 MMOL/L (ref 3.5–5.3)
RBC # BLD AUTO: 4.29 X10*6/UL (ref 4.5–5.9)
SODIUM SERPL-SCNC: 141 MMOL/L (ref 136–145)
WBC # BLD AUTO: 6.8 X10*3/UL (ref 4.4–11.3)

## 2024-11-13 PROCEDURE — 80048 BASIC METABOLIC PNL TOTAL CA: CPT | Performed by: INTERNAL MEDICINE

## 2024-11-13 PROCEDURE — 2720000007 HC OR 272 NO HCPCS: Performed by: INTERNAL MEDICINE

## 2024-11-13 PROCEDURE — 93458 L HRT ARTERY/VENTRICLE ANGIO: CPT | Performed by: INTERNAL MEDICINE

## 2024-11-13 PROCEDURE — 7100000010 HC PHASE TWO TIME - EACH INCREMENTAL 1 MINUTE: Performed by: INTERNAL MEDICINE

## 2024-11-13 PROCEDURE — C1894 INTRO/SHEATH, NON-LASER: HCPCS | Performed by: INTERNAL MEDICINE

## 2024-11-13 PROCEDURE — 36415 COLL VENOUS BLD VENIPUNCTURE: CPT | Performed by: INTERNAL MEDICINE

## 2024-11-13 PROCEDURE — C1760 CLOSURE DEV, VASC: HCPCS | Performed by: INTERNAL MEDICINE

## 2024-11-13 PROCEDURE — 2550000001 HC RX 255 CONTRASTS: Performed by: INTERNAL MEDICINE

## 2024-11-13 PROCEDURE — 2500000004 HC RX 250 GENERAL PHARMACY W/ HCPCS (ALT 636 FOR OP/ED): Performed by: INTERNAL MEDICINE

## 2024-11-13 PROCEDURE — 85027 COMPLETE CBC AUTOMATED: CPT | Performed by: INTERNAL MEDICINE

## 2024-11-13 PROCEDURE — C1887 CATHETER, GUIDING: HCPCS | Performed by: INTERNAL MEDICINE

## 2024-11-13 PROCEDURE — 96373 THER/PROPH/DIAG INJ IA: CPT | Performed by: INTERNAL MEDICINE

## 2024-11-13 PROCEDURE — 7100000009 HC PHASE TWO TIME - INITIAL BASE CHARGE: Performed by: INTERNAL MEDICINE

## 2024-11-13 RX ORDER — NITROGLYCERIN 40 MG/100ML
INJECTION INTRAVENOUS AS NEEDED
Status: DISCONTINUED | OUTPATIENT
Start: 2024-11-13 | End: 2024-11-13 | Stop reason: HOSPADM

## 2024-11-13 RX ORDER — LIDOCAINE HYDROCHLORIDE 20 MG/ML
INJECTION, SOLUTION INFILTRATION; PERINEURAL AS NEEDED
Status: DISCONTINUED | OUTPATIENT
Start: 2024-11-13 | End: 2024-11-13 | Stop reason: HOSPADM

## 2024-11-13 RX ORDER — FENTANYL CITRATE 50 UG/ML
INJECTION, SOLUTION INTRAMUSCULAR; INTRAVENOUS AS NEEDED
Status: DISCONTINUED | OUTPATIENT
Start: 2024-11-13 | End: 2024-11-13 | Stop reason: HOSPADM

## 2024-11-13 RX ORDER — METHYLPREDNISOLONE 4 MG/1
TABLET ORAL
Qty: 21 TABLET | Refills: 0 | Status: SHIPPED | OUTPATIENT
Start: 2024-11-13 | End: 2024-11-20

## 2024-11-13 RX ORDER — MIDAZOLAM HYDROCHLORIDE 1 MG/ML
INJECTION, SOLUTION INTRAMUSCULAR; INTRAVENOUS AS NEEDED
Status: DISCONTINUED | OUTPATIENT
Start: 2024-11-13 | End: 2024-11-13 | Stop reason: HOSPADM

## 2024-11-13 ASSESSMENT — PAIN - FUNCTIONAL ASSESSMENT
PAIN_FUNCTIONAL_ASSESSMENT: 0-10

## 2024-11-13 ASSESSMENT — PAIN SCALES - GENERAL
PAINLEVEL_OUTOF10: 0 - NO PAIN

## 2024-11-13 NOTE — DISCHARGE INSTRUCTIONS

## 2024-11-13 NOTE — POST-PROCEDURE NOTE
Physician Transition of Care Summary  Invasive Cardiovascular Lab    Procedure Date: 11/13/2024  Attending:    * Tamika Kearney - Primary  Resident/Fellow/Other Assistant: Surgeons and Role:  * No surgeons found with a matching role *    Indications:   Pre-op Diagnosis      * Coronary artery disease involving native heart, unspecified vessel or lesion type, unspecified whether angina present [I25.10]     * Other chest pain [R07.89]    Post-procedure diagnosis:   Post-op Diagnosis     * Coronary artery disease involving native heart, unspecified vessel or lesion type, unspecified whether angina present [I25.10]     * Other chest pain [R07.89]    Procedure(s):   Left Heart Cath  30360 - ME L HRT CATH W/NJX L VENTRICULOGRAPHY IMG S&I        Procedure Findings:   Widely patent previous LAD stent, minimal disease of LAD circumflex and the right coronary artery, normal left ventricular function    Description of the Procedure:   Left heart catheterization coronary angiography left ventriculogram    Complications:   None    Stents/Implants:   Implants       No implant documentation for this case.            Anticoagulation/Antiplatelet Plan:   Intravenous heparin    Estimated Blood Loss:   * No values recorded between 11/13/2024 10:43 AM and 11/13/2024 10:59 AM *    Anesthesia: Moderate Sedation Anesthesia Staff: No anesthesia staff entered.    Any Specimen(s) Removed:   Order Name Source Comment Collection Info Order Time   CBC Blood, Venous  Collected By: Layne Peck RN 11/13/2024  6:36 AM     Release result to [facility]   Immediate        BASIC METABOLIC PANEL Blood, Venous  Collected By: Layne Peck RN 11/13/2024  6:36 AM     Release result to [facility]   Immediate            Disposition:   Medical therapy, follow-up with the primary cardiologist      Electronically signed by: Tamika Kearney MD, 11/13/2024 10:59 AM

## 2024-11-13 NOTE — Clinical Note
Closure device placed in the right radial artery. Site closed by radial compression system. 15 ml of air in TR

## 2024-12-10 ENCOUNTER — OFFICE VISIT (OUTPATIENT)
Facility: CLINIC | Age: 84
End: 2024-12-10
Payer: COMMERCIAL

## 2024-12-10 VITALS
HEIGHT: 68 IN | TEMPERATURE: 98.6 F | OXYGEN SATURATION: 97 % | RESPIRATION RATE: 18 BRPM | DIASTOLIC BLOOD PRESSURE: 64 MMHG | BODY MASS INDEX: 32.61 KG/M2 | SYSTOLIC BLOOD PRESSURE: 118 MMHG | HEART RATE: 79 BPM | WEIGHT: 215.2 LBS

## 2024-12-10 DIAGNOSIS — M79.605 ACUTE LEG PAIN, LEFT: Primary | ICD-10-CM

## 2024-12-10 DIAGNOSIS — M79.89 SWELLING OF CALF: ICD-10-CM
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
## 2024-12-10 DIAGNOSIS — L03.116 CELLULITIS OF LEFT LEG: ICD-10-CM

## 2024-12-10 DIAGNOSIS — S81.852A CAT BITE OF LEFT LOWER LEG, INITIAL ENCOUNTER: ICD-10-CM

## 2024-12-10 PROCEDURE — 1159F MED LIST DOCD IN RCRD: CPT | Performed by: FAMILY MEDICINE

## 2024-12-10 PROCEDURE — 1036F TOBACCO NON-USER: CPT | Performed by: FAMILY MEDICINE

## 2024-12-10 PROCEDURE — 99214 OFFICE O/P EST MOD 30 MIN: CPT | Performed by: FAMILY MEDICINE

## 2024-12-10 PROCEDURE — 3078F DIAST BP <80 MM HG: CPT | Performed by: FAMILY MEDICINE

## 2024-12-10 PROCEDURE — 3074F SYST BP LT 130 MM HG: CPT | Performed by: FAMILY MEDICINE

## 2024-12-10 RX ORDER — CEPHALEXIN 500 MG/1
500 CAPSULE ORAL 3 TIMES DAILY
Qty: 21 CAPSULE | Refills: 0 | Status: SHIPPED | OUTPATIENT
Start: 2024-12-10 | End: 2024-12-10

## 2024-12-10 RX ORDER — AMOXICILLIN AND CLAVULANATE POTASSIUM 875; 125 MG/1; MG/1
875 TABLET, FILM COATED ORAL 2 TIMES DAILY
Qty: 20 TABLET | Refills: 0 | Status: SHIPPED | OUTPATIENT
Start: 2024-12-10 | End: 2024-12-20

## 2024-12-10 ASSESSMENT — ENCOUNTER SYMPTOMS
SHORTNESS OF BREATH: 0
HEADACHES: 0
LEG PAIN: 1

## 2024-12-10 NOTE — PROGRESS NOTES
"Subjective     Ry Pedro Aguirre is a 84 y.o. male who presents for Leg Pain (Left ).    Leg Pain        Pt reports bilateral leg pain which started about 4-5 days ago.  The day prior he was walking on his treadmill for 15 minutes.  He took acetaminophen and his leg pain resolved.  The next day he noticed left lower leg swelling, warmth.  He also is having trouble putting on his left shoe due to foot swelling.  No fevers or chills.  No hx of DVT or PE.  No hx of cellulitis.  No recent injury to skin of legs.  No recent long distance travel.  No hx of fracture in leg.      No hx of CHF.      Pt has DM II.  He has hypertension and is on spironolactone, coreg, lisinopril.     He is on atorvastatin 10 mg daily.  He sees cardiology for hx of CAD s/p PCI.     He had cardiac cath through Dr. Kearney last month - no stents placed at that time.  Medical therapy was recommended.  He was also started on imdur 50 mg.    Hx of LE edema, on torsemide.      He is not currently on asa 81.      No trouble breathing.  No shortness of breath.  No chest pain.  No hemoptysis.  His cough improved after he was seen last visit so he never started the antibiotic or steroid.       He does note that his cat either scratched or bit his left lateral calf about one week ago.  He has a small scab on his left leg.  No red streaking.      Review of Systems   Respiratory:  Negative for shortness of breath.    Cardiovascular:  Negative for chest pain.   Neurological:  Negative for headaches.       Objective     Vitals:    12/10/24 1245   BP: 118/64   BP Location: Right arm   Patient Position: Sitting   Pulse: 79   Resp: 18   Temp: 37 °C (98.6 °F)   SpO2: 97%   Weight: 97.6 kg (215 lb 3.2 oz)   Height: 1.727 m (5' 8\")        Current Outpatient Medications   Medication Instructions    amoxicillin-pot clavulanate (Augmentin) 875-125 mg tablet 875 mg, oral, 2 times daily    atorvastatin (Lipitor) 10 mg tablet TAKE 1 TABLET BY MOUTH ON MONDAYS, " WEDNESDAYS AND FRIDAYS    carvedilol (COREG) 3.125 mg, oral, Daily    cholecalciferol (Vitamin D3) 50 mcg (2,000 unit) capsule Take by mouth.  TAKE ONE TABLET DAILY ONLY IN WINTER    isosorbide mononitrate ER (IMDUR) 15 mg, oral, Daily, Do not crush or chew.    lisinopril 2.5 mg, Daily    methylphenidate (RITALIN) 10 mg, 2 times daily    multivitamin (MULTIPLE VITAMINS ORAL) Orally    nitroglycerin (NITROSTAT) 0.4 mg, sublingual, Every 5 min PRN, May repeat dose every 5 minutes for up to 3 doses total.    spironolactone (Aldactone) 25 mg tablet TAKE 1 TABLET MONDAYS, WEDNESDAYS AND FRIDAYS    torsemide (DEMADEX) 20 mg, 2 times weekly    vit A/vit C/vit E/zinc/copper (PRESERVISION AREDS ORAL) 1 capsule, 2 times daily        Allergies   Allergen Reactions    Oxycodone Hcl-Oxycodone-Asa Nausea Only and Other     Vomiting      Oxycodone-Aspirin GI Upset and Nausea/vomiting        Past Surgical History:   Procedure Laterality Date    CARDIAC CATHETERIZATION N/A 11/13/2024    Procedure: Left Heart Cath;  Surgeon: Tamika Kearney MD;  Location: [facility] Cardiac Cath Lab;  Service: Cardiovascular;  Laterality: N/A;    OTHER SURGICAL HISTORY  12/30/2019    Tonsillectomy    OTHER SURGICAL HISTORY  12/30/2019    Umbilical hernia repair    OTHER SURGICAL HISTORY  12/30/2019    Bariatric surgery    OTHER SURGICAL HISTORY  04/12/2021    Coronary artery stent placement    OTHER SURGICAL HISTORY  07/15/2022    Sigmoidoscopy    OTHER SURGICAL HISTORY  01/06/2022    Mao-en-Y gastric bypass    OTHER SURGICAL HISTORY  01/06/2022    Facial surgery    OTHER SURGICAL HISTORY  01/06/2022    Hernia repair    OTHER SURGICAL HISTORY  01/06/2022    Cataract surgery    OTHER SURGICAL HISTORY  01/06/2022    Anal fissurectomy    OTHER SURGICAL HISTORY  01/06/2022    Percutaneous transluminal coronary angioplasty        Social History     Tobacco Use    Smoking status: Former     Types: Cigarettes    Smokeless tobacco: Never   Vaping Use    Vaping  status: Never Used   Substance Use Topics    Alcohol use: Yes     Alcohol/week: 2.0 - 3.0 standard drinks of alcohol     Types: 2 - 3 Glasses of wine per week    Drug use: Not Currently        Family History   Problem Relation Name Age of Onset    Hypertension Mother      Diabetes Father      Lung cancer Father      Lung cancer Brother          Immunization History   Administered Date(s) Administered    Flu vaccine, quadrivalent, high-dose, preservative free, age 65y+ (FLUZONE) 12/05/2021, 10/23/2022    Flu vaccine, trivalent, preservative free, HIGH-DOSE, age 65y+ (Fluzone) 12/01/2017, 10/25/2018, 12/16/2019, 09/09/2024    Influenza, Seasonal, Quadrivalent, Adjuvanted 09/29/2020, 10/06/2023    Influenza, Unspecified 10/01/2018, 10/01/2020    Influenza, seasonal, injectable 10/01/2019    Moderna COVID-19 vaccine, 12 years and older (50mcg/0.5mL)(Spikevax) 11/12/2023    Moderna SARS-CoV-2 Vaccination 01/07/2021, 02/04/2021, 11/07/2021    Pneumococcal conjugate vaccine, 13-valent (PREVNAR 13) 12/30/2019    Pneumococcal polysaccharide vaccine, 23-valent, age 2 years and older (PNEUMOVAX 23) 12/01/2017, 10/01/2018    Small pox and monkeypox vaccine (Jynneos) *check dose/route* 12/06/2023, 03/19/2024    Zoster vaccine, recombinant, adult (SHINGRIX) 10/04/2020, 03/05/2021        Physical Exam  Vitals reviewed.   Constitutional:       General: He is not in acute distress.     Appearance: Normal appearance. He is well-developed. He is obese. He is not ill-appearing.   HENT:      Head: Normocephalic and atraumatic.   Eyes:      General: Lids are normal.      Conjunctiva/sclera:      Right eye: Right conjunctiva is not injected.      Left eye: Left conjunctiva is not injected.   Cardiovascular:      Rate and Rhythm: Normal rate and regular rhythm.      Heart sounds: No murmur heard.  Pulmonary:      Effort: Pulmonary effort is normal. No respiratory distress.      Breath sounds: Normal breath sounds. No wheezing, rhonchi or  rales.   Musculoskeletal:      Right lower leg: Edema present.      Left lower leg: Edema present.   Skin:     General: Skin is warm and dry.      Findings: No rash.      Comments: Left calf/leg pitting edema, mild erythema, mildly warm to touch.  A small abrasion/scab is noted over the left lateral calf (where patient reports his house cat may have bit or scratched him).  He has very mild ttp over left lower calf.  No pain with plantarflexion or dorsiflexion.  No red streaking.  No ulcers or sores of feet.     Neurological:      Mental Status: He is alert and oriented to person, place, and time. Mental status is at baseline.   Psychiatric:         Mood and Affect: Mood normal.         Behavior: Behavior normal.         Assessment & Plan  Acute leg pain, left  Acute left lower leg swelling, pain, erythema - concern for possible left LE DVT vs cellulitis.  I will order stat left LE venous duplex and also treat for possible cellulitis (associated with cat bite)  with augmentin BID x 10 days.  Pt agreeable to plan.  Follow up if no improvement or if symptoms worsen.  Proceed to the nearest emergency department if condition worsens significantly/becomes severe in nature.     Orders:    Lower extremity venous duplex left; Future    CBC and Auto Differential; Future    Comprehensive Metabolic Panel; Future    Swelling of calf    Orders:    Lower extremity venous duplex left; Future    Cellulitis of left leg    Orders:    CBC and Auto Differential; Future    Comprehensive Metabolic Panel; Future    amoxicillin-pot clavulanate (Augmentin) 875-125 mg tablet; Take 1 tablet (875 mg) by mouth 2 times a day for 10 days.    Cat bite of left lower leg, initial encounter    Orders:    amoxicillin-pot clavulanate (Augmentin) 875-125 mg tablet; Take 1 tablet (875 mg) by mouth 2 times a day for 10 days.

## 2024-12-11 ENCOUNTER — HOSPITAL ENCOUNTER (OUTPATIENT)
Dept: CARDIOLOGY | Facility: HOSPITAL | Age: 84
Discharge: HOME | End: 2024-12-11
Payer: COMMERCIAL

## 2024-12-11 DIAGNOSIS — M79.605 ACUTE LEG PAIN, LEFT: ICD-10-CM

## 2024-12-11 DIAGNOSIS — R60.0 LOCALIZED EDEMA: ICD-10-CM

## 2024-12-11 DIAGNOSIS — M79.89 SWELLING OF CALF: ICD-10-CM

## 2024-12-11 PROCEDURE — 93971 EXTREMITY STUDY: CPT

## 2024-12-11 PROCEDURE — 93971 EXTREMITY STUDY: CPT | Performed by: INTERNAL MEDICINE

## 2024-12-26 DIAGNOSIS — I10 HYPERTENSION, ESSENTIAL, BENIGN: ICD-10-CM

## 2024-12-27 RX ORDER — LISINOPRIL 2.5 MG/1
2.5 TABLET ORAL DAILY
Qty: 90 TABLET | Refills: 3 | Status: SHIPPED | OUTPATIENT
Start: 2024-12-27 | End: 2025-12-27

## 2024-12-27 NOTE — TELEPHONE ENCOUNTER
Received request for prescription refills for patient.   Patient follows with Dr. Amber MD     Request is for Lisinopril  Is patient currently on medication, YES     Last OV 10/17/2024  Next OV 1/23/2025    Pended for signing and sent to provider

## 2025-01-08 DIAGNOSIS — R07.89 OTHER CHEST PAIN: ICD-10-CM

## 2025-01-09 RX ORDER — ISOSORBIDE MONONITRATE 30 MG/1
15 TABLET, EXTENDED RELEASE ORAL DAILY
Qty: 45 TABLET | Refills: 1 | Status: SHIPPED | OUTPATIENT
Start: 2025-01-09 | End: 2025-07-08

## 2025-01-13 ENCOUNTER — APPOINTMENT (OUTPATIENT)
Dept: PRIMARY CARE | Facility: CLINIC | Age: 85
End: 2025-01-13
Payer: COMMERCIAL

## 2025-01-23 ENCOUNTER — APPOINTMENT (OUTPATIENT)
Dept: CARDIOLOGY | Facility: CLINIC | Age: 85
End: 2025-01-23
Payer: COMMERCIAL

## 2025-01-23 VITALS
DIASTOLIC BLOOD PRESSURE: 60 MMHG | BODY MASS INDEX: 32.34 KG/M2 | WEIGHT: 213.4 LBS | HEIGHT: 68 IN | SYSTOLIC BLOOD PRESSURE: 122 MMHG | HEART RATE: 73 BPM

## 2025-01-23 DIAGNOSIS — I10 HYPERTENSION, ESSENTIAL, BENIGN: ICD-10-CM

## 2025-01-23 DIAGNOSIS — I77.810 DILATED AORTIC ROOT (CMS-HCC): ICD-10-CM

## 2025-01-23 DIAGNOSIS — Z87.891 FORMER SMOKER: ICD-10-CM

## 2025-01-23 DIAGNOSIS — I25.10 CORONARY ARTERY DISEASE INVOLVING NATIVE HEART, UNSPECIFIED VESSEL OR LESION TYPE, UNSPECIFIED WHETHER ANGINA PRESENT: ICD-10-CM

## 2025-01-23 DIAGNOSIS — E78.2 MIXED HYPERLIPIDEMIA: ICD-10-CM

## 2025-01-23 DIAGNOSIS — I73.9 PERIPHERAL VASCULAR DISEASE (CMS-HCC): ICD-10-CM

## 2025-01-23 PROCEDURE — 1159F MED LIST DOCD IN RCRD: CPT | Performed by: INTERNAL MEDICINE

## 2025-01-23 PROCEDURE — 3074F SYST BP LT 130 MM HG: CPT | Performed by: INTERNAL MEDICINE

## 2025-01-23 PROCEDURE — 99214 OFFICE O/P EST MOD 30 MIN: CPT | Performed by: INTERNAL MEDICINE

## 2025-01-23 PROCEDURE — 3078F DIAST BP <80 MM HG: CPT | Performed by: INTERNAL MEDICINE

## 2025-01-23 RX ORDER — SPIRONOLACTONE 25 MG/1
25 TABLET ORAL DAILY
Qty: 90 TABLET | Refills: 3 | Status: CANCELLED | OUTPATIENT
Start: 2025-01-23 | End: 2026-01-23

## 2025-01-23 RX ORDER — OMEPRAZOLE 20 MG/1
20 TABLET, DELAYED RELEASE ORAL
COMMUNITY

## 2025-01-23 RX ORDER — CARVEDILOL 3.12 MG/1
3.12 TABLET ORAL DAILY
Qty: 90 TABLET | Refills: 3 | Status: CANCELLED | OUTPATIENT
Start: 2025-01-23

## 2025-01-23 ASSESSMENT — ENCOUNTER SYMPTOMS
NEUROLOGICAL NEGATIVE: 1
CONSTITUTIONAL NEGATIVE: 1
RESPIRATORY NEGATIVE: 1
SHORTNESS OF BREATH: 0
CARDIOVASCULAR NEGATIVE: 1

## 2025-01-23 NOTE — PROGRESS NOTES
CARDIOLOGY OFFICE VISIT      CHIEF COMPLAINT  Chief Complaint   Patient presents with    Follow-up        HISTORY OF PRESENT ILLNESS  Ry Aguirre is a 84 y.o. 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  His most recent echocardiogram in March 2023 shows normalized EF of about 60% with apical wall motion abnormalities that is unchanged.  His EF is improved from 45% on previous echocardiogram.  He describes intermittent feeling of doom with chest pressure that tends to occur in the night, lasting for about a minute and resolves by itself.  He subsequently had repeat stress test on 7/31/2024 which shows a large apical myocardial infarction with no independent ischemia.  His EF was reported to be 59%.      He has intermittent chest pain for which we started him on Imdur and had a repeat cardiac catheterization in November 2024 that showed patent LAD stent with mild irregularities in the RCA and left circumflex.  EF was reported to be normal he said he has not had any recurrent chest pain since the Imdur was started.      ASSESSMENT AND PLAN:  1.  CAD s/p LAD stenting for acute anterior myocardial infarct with repeat stress test as noted above for intermittent atypical chest pressure.  Follow-up cardiac catheterization as noted above did not show any new obstructive CAD.  Will continue with current medical therapy.    2.   Hypertension: Blood pressure is well-controlled on his current medications which we will continue.  3.  Dilated ascending aorta: This is stable on recent echocardiogram at 3.9 cm.  4.  Dyslipidemia: With acceptable lipid profile as noted on his recent labs, we will continue with current lipid-lowering therapy.    Problem List Items Addressed This Visit             ICD-10-CM       Cardiac and Vasculature    CAD (coronary artery disease) I25.10    Dilated aortic root (CMS-[facility]) I77.810    Hypertension, essential, benign I10     Mixed hyperlipidemia E78.2    Peripheral vascular disease (CMS-HCC) I73.9       Endocrine/Metabolic    Adult BMI 32.0-32.9 kg/sq m Z68.32       Tobacco    Former smoker Z87.891       Recent Cardiovascular Testing:    Echo-3/2023 CONCLUSIONS:  1. Left ventricular systolic function is normal with a 60-65% estimated ejection fraction.  2. [place] is abnormal.  3. Spectral Doppler shows an impaired relaxation pattern of left ventricular diastolic filling.  4. There is no evidence of cardiac tamponade.  5. No evidence of mitral valve prolapse.  6. There is No tricuspid stenosis.  7. RVSP within normal limits.  8. Aortic valve stenosis is not present.  9. Mild aortic valve regurgitation.  10. Compared with echo 6/2019 LVEF has improved.  Stress-  Cath-  Carotid Ultrasound-    Past Medical History  Past Medical History:   Diagnosis Date    CAD (coronary artery disease)     Hyperlipidemia     Hypertension     Peripheral vascular disease (CMS-HCC)     Sleep apnea        Social History  Social History     Tobacco Use    Smoking status: Former     Types: Cigarettes    Smokeless tobacco: Never   Vaping Use    Vaping status: Never Used   Substance Use Topics    Alcohol use: Yes     Alcohol/week: 2.0 - 3.0 standard drinks of alcohol     Types: 2 - 3 Glasses of wine per week    Drug use: Not Currently       Family History     Family History   Problem Relation Name Age of Onset    Hypertension Mother      Diabetes Father      Lung cancer Father      Lung cancer Brother          Allergies:  Allergies   Allergen Reactions    Oxycodone Hcl-Oxycodone-Asa Nausea Only and Other     Vomiting      Oxycodone-Aspirin GI Upset and Nausea/vomiting        Outpatient Medications:  Current Outpatient Medications   Medication Instructions    atorvastatin (Lipitor) 10 mg tablet TAKE 1 TABLET BY MOUTH ON MONDAYS, WEDNESDAYS AND FRIDAYS    carvedilol (COREG) 3.125 mg, oral, Daily    cholecalciferol (Vitamin D3) 50 mcg (2,000 unit) capsule Take by mouth.  " TAKE ONE TABLET DAILY ONLY IN WINTER    isosorbide mononitrate ER (IMDUR) 15 mg, oral, Daily, Do not crush or chew.    lisinopril 2.5 mg, oral, Daily    methylphenidate (RITALIN) 10 mg, 2 times daily    multivitamin (MULTIPLE VITAMINS ORAL) Orally    nitroglycerin (NITROSTAT) 0.4 mg, sublingual, Every 5 min PRN, May repeat dose every 5 minutes for up to 3 doses total.    omeprazole OTC (PRILOSEC OTC) 20 mg, Daily before breakfast    spironolactone (Aldactone) 25 mg tablet TAKE 1 TABLET MONDAYS, WEDNESDAYS AND FRIDAYS    torsemide (DEMADEX) 20 mg, 2 times weekly    vit A/vit C/vit E/zinc/copper (PRESERVISION AREDS ORAL) 1 capsule, 2 times daily        Recent Lab Results:    CBC:   Lab Results   Component Value Date    WBC 6.8 11/13/2024    RBC 4.29 (L) 11/13/2024    HGB 13.7 11/13/2024    HCT 41.7 11/13/2024     11/13/2024        CMP:    Lab Results   Component Value Date     11/13/2024    K 4.1 11/13/2024     11/13/2024    CO2 32 11/13/2024    BUN 17 11/13/2024    CREATININE 0.75 11/13/2024    GLUCOSE 125 (H) 11/13/2024    CALCIUM 9.0 11/13/2024       Magnesium:    No results found for: \"MG\"    Lipid Profile:    Lab Results   Component Value Date    TRIG 125 05/21/2024    HDL 38.6 05/21/2024    LDLCALC 83 05/21/2024       TSH:    No results found for: \"TSH\"    BNP:   No results found for: \"BNP\"     PT/INR:    No results found for: \"PROTIME\", \"INR\"    HgBA1c:    Lab Results   Component Value Date    HGBA1C 6.6 (A) 09/09/2024       BMP:  Lab Results   Component Value Date     11/13/2024     05/21/2024     07/15/2022     04/12/2021     07/30/2020    K 4.1 11/13/2024    K 4.1 05/21/2024    K 4.3 07/15/2022    K 4.3 04/12/2021    K 3.8 07/30/2020     11/13/2024     05/21/2024     07/15/2022     04/12/2021     07/30/2020    CO2 32 11/13/2024    CO2 31 05/21/2024    CO2 33 (H) 07/15/2022    CO2 33 (H) 04/12/2021    CO2 32 07/30/2020    BUN 17 " "11/13/2024    BUN 20 05/21/2024    BUN 18 07/15/2022    BUN 21 04/12/2021    BUN 19 07/30/2020    CREATININE 0.75 11/13/2024    CREATININE 0.90 05/21/2024    CREATININE 0.89 07/15/2022    CREATININE 1.06 04/12/2021    CREATININE 0.99 07/30/2020       CBC:  Lab Results   Component Value Date    WBC 6.8 11/13/2024    WBC 5.8 05/21/2024    WBC 6.0 07/15/2022    WBC 6.1 04/12/2021    WBC 8.3 03/04/2020    RBC 4.29 (L) 11/13/2024    RBC 4.37 (L) 05/21/2024    RBC 4.27 (L) 07/15/2022    RBC 4.29 (L) 04/12/2021    RBC 4.11 (L) 03/04/2020    HGB 13.7 11/13/2024    HGB 14.0 05/21/2024    HGB 13.6 07/15/2022    HGB 13.9 04/12/2021    HGB 13.0 (L) 03/04/2020    HCT 41.7 11/13/2024    HCT 43.2 05/21/2024    HCT 41.8 07/15/2022    HCT 42.5 04/12/2021    HCT 39.1 (L) 03/04/2020    MCV 97 11/13/2024    MCV 99 05/21/2024    MCV 98 07/15/2022    MCV 99 04/12/2021    MCV 95 03/04/2020    MCH 31.9 11/13/2024    MCH 32.0 05/21/2024    MCHC 32.9 11/13/2024    MCHC 32.4 05/21/2024    MCHC 32.5 07/15/2022    MCHC 32.7 04/12/2021    MCHC 33.2 03/04/2020    RDW 12.3 11/13/2024    RDW 12.4 05/21/2024    RDW 11.9 07/15/2022    RDW 11.9 04/12/2021    RDW 12.3 03/04/2020     11/13/2024     05/21/2024     07/15/2022     04/12/2021     03/04/2020       Cardiac Enzymes:    No results found for: \"TROPHS\"    Hepatic Function Panel:    Lab Results   Component Value Date    ALKPHOS 65 05/21/2024    ALT 9 (L) 05/21/2024    AST 13 05/21/2024    PROT 6.1 (L) 05/21/2024    BILITOT 0.7 05/21/2024    BILIDIR 0.1 03/04/2021         REVIEW OF SYSTEMS  Review of Systems   Constitutional: Negative.   Cardiovascular: Negative.  Negative for chest pain.   Respiratory: Negative.  Negative for shortness of breath.    Neurological: Negative.        VITALS  Vitals:    01/23/25 1429   BP: 122/60   Pulse: 73     Wt Readings from Last 4 Encounters:   01/23/25 96.8 kg (213 lb 6.4 oz)   12/10/24 97.6 kg (215 lb 3.2 oz)   11/13/24 93 kg " (205 lb)   11/01/24 97.1 kg (214 lb)       PHYSICAL EXAM  Vitals reviewed.   Constitutional:       Appearance: Healthy appearance.      Comments: obese   Neck:      Thyroid: Thyroid normal.      Vascular: JVD normal.   Pulmonary:      Effort: Pulmonary effort is normal.      Breath sounds: Normal breath sounds.   Cardiovascular:      Normal rate. Regular rhythm.      Murmurs: There is no murmur.   Edema:     Peripheral edema absent.   Musculoskeletal: Normal range of motion.      Cervical back: Normal range of motion. Skin:     General: Skin is warm and dry.   Neurological:      General: No focal deficit present.      Mental Status: Alert and oriented to person, place and time.         Scribe Attestation  By signing my name below, I, Cm Clark MD  , Scribe   attest that this documentation has been prepared under the direction and in the presence of Cm Clark MD.

## 2025-02-11 ENCOUNTER — APPOINTMENT (OUTPATIENT)
Facility: CLINIC | Age: 85
End: 2025-02-11
Payer: COMMERCIAL

## 2025-02-13 DIAGNOSIS — E78.2 MIXED HYPERLIPIDEMIA: ICD-10-CM

## 2025-02-13 RX ORDER — ATORVASTATIN CALCIUM 10 MG/1
TABLET, FILM COATED ORAL
Qty: 36 TABLET | Refills: 1 | Status: SHIPPED | OUTPATIENT
Start: 2025-02-13

## 2025-03-18 ENCOUNTER — APPOINTMENT (OUTPATIENT)
Facility: CLINIC | Age: 85
End: 2025-03-18
Payer: COMMERCIAL

## 2025-03-20 ENCOUNTER — TELEPHONE (OUTPATIENT)
Dept: UROLOGY | Facility: CLINIC | Age: 85
End: 2025-03-20
Payer: COMMERCIAL

## 2025-03-20 NOTE — TELEPHONE ENCOUNTER
LVM on nurse line requesting to schedule CT and reschedule follow up for results. CT stone ordered during last office and follows up on 4/1/25 for results.     Call returned with no answer. Message left, provided with radiology scheduling number and advised to notify office once CT is scheduled. Made aware Dr. Da Silva is leaving  and scheduling is limited through April.

## 2025-03-21 DIAGNOSIS — N21.0 BLADDER STONE: ICD-10-CM

## 2025-03-21 NOTE — TELEPHONE ENCOUNTER
Pt stated he was told by radiology that CT order was . New order placed, provided with radiology scheduling number. Pt will notify office if he is unable to get CT prior to appt with Dr. Da Silva.
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
## 2025-03-25 ENCOUNTER — HOSPITAL ENCOUNTER (OUTPATIENT)
Dept: RADIOLOGY | Facility: CLINIC | Age: 85
Discharge: HOME | End: 2025-03-25
Payer: COMMERCIAL

## 2025-03-25 DIAGNOSIS — R91.1 LUNG NODULE: ICD-10-CM

## 2025-03-25 PROCEDURE — 71250 CT THORAX DX C-: CPT

## 2025-03-27 ENCOUNTER — APPOINTMENT (OUTPATIENT)
Dept: SURGERY | Facility: HOSPITAL | Age: 85
End: 2025-03-27
Payer: COMMERCIAL

## 2025-04-01 ENCOUNTER — APPOINTMENT (OUTPATIENT)
Dept: UROLOGY | Facility: CLINIC | Age: 85
End: 2025-04-01
Payer: COMMERCIAL

## 2025-04-03 ENCOUNTER — HOSPITAL ENCOUNTER (OUTPATIENT)
Dept: RADIOLOGY | Facility: CLINIC | Age: 85
Discharge: HOME | End: 2025-04-03
Payer: COMMERCIAL

## 2025-04-03 ENCOUNTER — APPOINTMENT (OUTPATIENT)
Dept: SURGERY | Facility: HOSPITAL | Age: 85
End: 2025-04-03
Payer: COMMERCIAL

## 2025-04-03 DIAGNOSIS — N21.0 BLADDER STONE: ICD-10-CM

## 2025-04-03 PROCEDURE — 74176 CT ABD & PELVIS W/O CONTRAST: CPT

## 2025-04-08 ENCOUNTER — APPOINTMENT (OUTPATIENT)
Dept: UROLOGY | Facility: CLINIC | Age: 85
End: 2025-04-08
Payer: COMMERCIAL

## 2025-04-08 DIAGNOSIS — N40.0 BENIGN PROSTATIC HYPERPLASIA WITHOUT LOWER URINARY TRACT SYMPTOMS: ICD-10-CM

## 2025-04-08 DIAGNOSIS — N21.0 BLADDER STONE: Primary | ICD-10-CM

## 2025-04-08 PROCEDURE — G2211 COMPLEX E/M VISIT ADD ON: HCPCS | Performed by: UROLOGY

## 2025-04-08 PROCEDURE — 99213 OFFICE O/P EST LOW 20 MIN: CPT | Performed by: UROLOGY

## 2025-04-08 NOTE — PROGRESS NOTES
Subjective   Patient ID: Ry Aguirre is a 84 y.o. male who presents for CT results. Last seen 3/8/24 when We discussed the CT findings of the renal cysts, 5 mm kidney stone, and 9 mm bladder stone that has been unchanged. We will continue to watch. Patient will call about hematuria, left flank pain, or dysuria. Order CT a/p wo contrast for 1 year and see the patient back in follow-up with results.     Virtual or Telephone Consent  Verbal consent was requested and obtained from the patient for a telehealth visit and the patient's location was confirmed at the time of the visit.    HPI  The patient states he has been doing well. The patient denies symptoms including hematuria, flank pain, and dysuria. Patient notes he is not having trouble with urgency, control, or frequency. The patient is not taking any urologic medications.     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     CT A/P wo contrast  IMPRESSION:  1.  Nonobstructing layering bladder calculi at the ureterovesical  junctions, the left side of which is new when compared to prior exam  and may be secondary to increasing calculi burden versus  redistribution of previously existing calculi, without evidence of  hydroureteronephrosis.  2. Mild circumferential bladder wall thickening with associated  bladder diverticula and mild adjacent fat stranding, findings which  may be seen in the setting of chronic bladder outlet obstruction with  acute cystitis and entirely excluded. Recommend correlation with  urinalysis for further evaluation.  3. Cholelithiasis without evidence acute cholecystitis.  4. Moderate-sized hiatal hernia.  5. Bilateral renal cysts, including a likely proteinaceous versus  hemorrhagic cyst within the superior pole on the left.  6. Advanced lumbar spine degenerative changes at L4-5 and L5-S1.  7. Additional findings otherwise stable as detailed above.      Review of Systems  A 12 system review was  completed and is negative with the exception of those signs and symptoms noted in the history of present illness.    Objective   Physical Exam  General: in NAD, appears stated age  Head: normocephalic, atraumatic  Respiratory: normal effort, no use of accessory muscles  Cardiovascular: no edema noted  Skin: normal turgor, no rashes  Neurologic: grossly intact, oriented to person/place/time  Psychiatric: mode and affect appropriate     Assessment/Plan     We discussed imaging results including bladder wall thickening and diverticula which indicates BRAND. Kidney function is stable. The patient is not taking any urologic medications. I do not feel a repeat CT is warranted unless the patient experiences new symptoms in the interval. Follow-up in 1 year with one of my colleagues.       Scribe Attestation  By signing my name below, I, Darek Andres   attest that this documentation has been prepared under the direction and in the presence of Michi Da Silav MD.
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
## 2025-04-10 ENCOUNTER — TELEMEDICINE (OUTPATIENT)
Dept: SURGERY | Facility: HOSPITAL | Age: 85
End: 2025-04-10
Payer: COMMERCIAL

## 2025-04-10 DIAGNOSIS — R91.1 LUNG NODULE: ICD-10-CM

## 2025-04-10 DIAGNOSIS — R91.8 LUNG NODULES: Primary | ICD-10-CM

## 2025-04-15 ENCOUNTER — OFFICE VISIT (OUTPATIENT)
Dept: CARDIOLOGY | Facility: CLINIC | Age: 85
End: 2025-04-15
Payer: COMMERCIAL

## 2025-04-15 VITALS
HEART RATE: 72 BPM | SYSTOLIC BLOOD PRESSURE: 118 MMHG | DIASTOLIC BLOOD PRESSURE: 64 MMHG | BODY MASS INDEX: 32.8 KG/M2 | WEIGHT: 216.4 LBS | HEIGHT: 68 IN

## 2025-04-15 DIAGNOSIS — I25.10 CORONARY ARTERY DISEASE INVOLVING NATIVE CORONARY ARTERY OF NATIVE HEART WITHOUT ANGINA PECTORIS: ICD-10-CM

## 2025-04-15 DIAGNOSIS — I10 HYPERTENSION, ESSENTIAL, BENIGN: ICD-10-CM

## 2025-04-15 DIAGNOSIS — I73.9 PERIPHERAL VASCULAR DISEASE (CMS-HCC): ICD-10-CM

## 2025-04-15 DIAGNOSIS — Z87.891 FORMER SMOKER: ICD-10-CM

## 2025-04-15 DIAGNOSIS — I77.810 DILATED AORTIC ROOT (CMS-HCC): ICD-10-CM

## 2025-04-15 DIAGNOSIS — R06.02 SHORTNESS OF BREATH: ICD-10-CM

## 2025-04-15 DIAGNOSIS — R07.89 OTHER CHEST PAIN: ICD-10-CM

## 2025-04-15 DIAGNOSIS — G47.33 OBSTRUCTIVE SLEEP APNEA: ICD-10-CM

## 2025-04-15 DIAGNOSIS — E78.2 MIXED HYPERLIPIDEMIA: ICD-10-CM

## 2025-04-15 DIAGNOSIS — R07.9 CHEST PAIN, UNSPECIFIED TYPE: ICD-10-CM

## 2025-04-15 DIAGNOSIS — I20.89 OTHER FORMS OF ANGINA PECTORIS: ICD-10-CM

## 2025-04-15 PROCEDURE — 1159F MED LIST DOCD IN RCRD: CPT | Performed by: INTERNAL MEDICINE

## 2025-04-15 PROCEDURE — 3078F DIAST BP <80 MM HG: CPT | Performed by: INTERNAL MEDICINE

## 2025-04-15 PROCEDURE — 3074F SYST BP LT 130 MM HG: CPT | Performed by: INTERNAL MEDICINE

## 2025-04-15 PROCEDURE — 93000 ELECTROCARDIOGRAM COMPLETE: CPT | Performed by: INTERNAL MEDICINE

## 2025-04-15 PROCEDURE — 99214 OFFICE O/P EST MOD 30 MIN: CPT | Performed by: INTERNAL MEDICINE

## 2025-04-15 RX ORDER — ISOSORBIDE MONONITRATE 30 MG/1
30 TABLET, EXTENDED RELEASE ORAL DAILY
Qty: 90 TABLET | Refills: 1 | Status: SHIPPED | OUTPATIENT
Start: 2025-04-15 | End: 2025-10-12

## 2025-04-15 ASSESSMENT — ENCOUNTER SYMPTOMS
CARDIOVASCULAR NEGATIVE: 1
RESPIRATORY NEGATIVE: 1
CONSTITUTIONAL NEGATIVE: 1
NEUROLOGICAL NEGATIVE: 1

## 2025-04-15 NOTE — PATIENT INSTRUCTIONS
Increase isosorbide to 30 mg a day  2  week visit  Troponin and BNP today     Patient educated on proper medication use.   Patient educated on risk factor modification.   Please bring any lab results from other providers / physicians to your next appointment.     Please bring all medicines, vitamins, and herbal supplements with you when you come to the office.     Prescriptions will not be filled unless you are compliant with your follow up appointments or have a follow up appointment scheduled as per instruction of your physician. Refills should be requested at the time of your visit.

## 2025-04-15 NOTE — PROGRESS NOTES
CARDIOLOGY OFFICE VISIT      CHIEF COMPLAINT  Chief Complaint   Patient presents with    Follow-up     Pt experiencing occasional chest but not enough to go to ER.        HISTORY OF PRESENT ILLNESS  Ry Aguirre is a 84 y.o. year old male patient with history of CAD and old anterior myocardial infarction status post LAD stent 2015.  He complained of recurrent chest pain for which he had a repeat cardiac catheterization in November 2020 for that showed patent LAD stent with mild luminal irregularities in the RCA and left circumflex.  EF was normal.  He has been doing well but he complained of some chest pain 2 days ago for which she had to take some increased dose of Imdur.  He has not had any recurrent chest pain since then.    Stress test from July 2024 showed a large apical infarct with no ischemia.  EF was 69%.  EKG today shows sinus rhythm with poor R wave progression, but no acute ST or T wave changes    ASSESSMENT AND PLAN  1.  CAD status post LAD stent for acute anterior MI with a repeat stress test and cardiac catheterization as noted above.  I have increased his Imdur to 30 mg daily and I will reevaluate in a couple of weeks if he has continued chest pain, we will consider repeat cardiac catheterization at that time.  2.  Hypertension: Blood pressure is well-controlled, will continue with Coreg, Imdur and spironolactone at the current dose.  3.  Dyslipidemia: Lipids acceptable continue with Lipitor 10 mg which she is only able to tolerate 3 days a week.  4.  Dilated ascending aorta: This is stable at 3.9 cm.  Problem List Items Addressed This Visit       CAD (coronary artery disease)    Dilated aortic root (CMS-HCC)    Hypertension, essential, benign    Mixed hyperlipidemia    Obstructive sleep apnea    Peripheral vascular disease (CMS-HCC)    Adult BMI 32.0-32.9 kg/sq m    Former smoker    Chest pain           Past Medical History  Past Medical History:   Diagnosis Date    CAD (coronary artery  disease)     Hyperlipidemia     Hypertension     Peripheral vascular disease (CMS-[facility])     Sleep apnea        Social History  Social History     Tobacco Use    Smoking status: Former     Current packs/day: 0.00     Types: Cigarettes     Quit date:      Years since quittin.3    Smokeless tobacco: Never   Vaping Use    Vaping status: Never Used   Substance Use Topics    Alcohol use: Yes     Alcohol/week: 2.0 - 3.0 standard drinks of alcohol     Types: 2 - 3 Glasses of wine per week    Drug use: Not Currently       Family History     Family History   Problem Relation Name Age of Onset    Hypertension Mother      Diabetes Father      Lung cancer Father      Lung cancer Brother          Allergies:  Allergies   Allergen Reactions    Oxycodone Hcl-Oxycodone-Asa Nausea Only and Other     Vomiting      Oxycodone-Aspirin GI Upset and Nausea/vomiting        Outpatient Medications:  Current Outpatient Medications   Medication Instructions    atorvastatin (Lipitor) 10 mg tablet TAKE 1 TABLET BY MOUTH ON ,  AND     carvedilol (COREG) 3.125 mg, oral, Daily    cholecalciferol (Vitamin D3) 50 mcg (2,000 unit) capsule Take by mouth.  TAKE ONE TABLET DAILY ONLY IN WINTER    isosorbide mononitrate ER (IMDUR) 15 mg, oral, Daily, Do not crush or chew.    lisinopril 2.5 mg, oral, Daily    methylphenidate (RITALIN) 10 mg, 2 times daily    multivitamin (MULTIPLE VITAMINS ORAL) Orally    nitroglycerin (NITROSTAT) 0.4 mg, sublingual, Every 5 min PRN, May repeat dose every 5 minutes for up to 3 doses total.    omeprazole OTC (PRILOSEC OTC) 20 mg, Daily before breakfast    spironolactone (Aldactone) 25 mg tablet TAKE 1 TABLET ,  AND     torsemide (DEMADEX) 20 mg, 2 times weekly    vit A/vit C/vit E/zinc/copper (PRESERVISION AREDS ORAL) 1 capsule, 2 times daily        Recent Lab Results:  I have personally reviewed the below noted lab results:    CBC:   Lab Results   Component Value Date  "   WBC 6.8 11/13/2024    RBC 4.29 (L) 11/13/2024    HGB 13.7 11/13/2024    HCT 41.7 11/13/2024     11/13/2024        CMP:    Lab Results   Component Value Date     11/13/2024    K 4.1 11/13/2024     11/13/2024    CO2 32 11/13/2024    BUN 17 11/13/2024    CREATININE 0.75 11/13/2024    GLUCOSE 125 (H) 11/13/2024    CALCIUM 9.0 11/13/2024       Magnesium:    No results found for: \"MG\"    Lipid Profile:    Lab Results   Component Value Date    TRIG 125 05/21/2024    HDL 38.6 05/21/2024    LDLCALC 83 05/21/2024       TSH:    No results found for: \"TSH\"    BNP:   No results found for: \"BNP\"     PT/INR:    No results found for: \"PROTIME\", \"INR\"    HgBA1c:    Lab Results   Component Value Date    HGBA1C 6.6 (A) 09/09/2024       BMP:  Lab Results   Component Value Date     11/13/2024     05/21/2024     07/15/2022     04/12/2021     07/30/2020    K 4.1 11/13/2024    K 4.1 05/21/2024    K 4.3 07/15/2022    K 4.3 04/12/2021    K 3.8 07/30/2020     11/13/2024     05/21/2024     07/15/2022     04/12/2021     07/30/2020    CO2 32 11/13/2024    CO2 31 05/21/2024    CO2 33 (H) 07/15/2022    CO2 33 (H) 04/12/2021    CO2 32 07/30/2020    BUN 17 11/13/2024    BUN 20 05/21/2024    BUN 18 07/15/2022    BUN 21 04/12/2021    BUN 19 07/30/2020    CREATININE 0.75 11/13/2024    CREATININE 0.90 05/21/2024    CREATININE 0.89 07/15/2022    CREATININE 1.06 04/12/2021    CREATININE 0.99 07/30/2020       CBC:  Lab Results   Component Value Date    WBC 6.8 11/13/2024    WBC 5.8 05/21/2024    WBC 6.0 07/15/2022    WBC 6.1 04/12/2021    WBC 8.3 03/04/2020    RBC 4.29 (L) 11/13/2024    RBC 4.37 (L) 05/21/2024    RBC 4.27 (L) 07/15/2022    RBC 4.29 (L) 04/12/2021    RBC 4.11 (L) 03/04/2020    HGB 13.7 11/13/2024    HGB 14.0 05/21/2024    HGB 13.6 07/15/2022    HGB 13.9 04/12/2021    HGB 13.0 (L) 03/04/2020    HCT 41.7 11/13/2024    HCT 43.2 05/21/2024    HCT 41.8 07/15/2022 " "   HCT 42.5 04/12/2021    HCT 39.1 (L) 03/04/2020    MCV 97 11/13/2024    MCV 99 05/21/2024    MCV 98 07/15/2022    MCV 99 04/12/2021    MCV 95 03/04/2020    MCH 31.9 11/13/2024    MCH 32.0 05/21/2024    MCHC 32.9 11/13/2024    MCHC 32.4 05/21/2024    MCHC 32.5 07/15/2022    MCHC 32.7 04/12/2021    MCHC 33.2 03/04/2020    RDW 12.3 11/13/2024    RDW 12.4 05/21/2024    RDW 11.9 07/15/2022    RDW 11.9 04/12/2021    RDW 12.3 03/04/2020     11/13/2024     05/21/2024     07/15/2022     04/12/2021     03/04/2020       Cardiac Enzymes:    No results found for: \"TROPHS\"    Hepatic Function Panel:    Lab Results   Component Value Date    ALKPHOS 65 05/21/2024    ALT 9 (L) 05/21/2024    AST 13 05/21/2024    PROT 6.1 (L) 05/21/2024    BILITOT 0.7 05/21/2024    BILIDIR 0.1 03/04/2021         REVIEW OF SYSTEMS  Review of Systems   Constitutional: Negative.   Cardiovascular: Negative.    Respiratory: Negative.     Neurological: Negative.    All other systems reviewed and are negative.      VITALS  Vitals:    04/15/25 1204   BP: 118/64   Pulse: 72     Wt Readings from Last 4 Encounters:   04/15/25 98.2 kg (216 lb 6.4 oz)   01/23/25 96.8 kg (213 lb 6.4 oz)   12/10/24 97.6 kg (215 lb 3.2 oz)   11/13/24 93 kg (205 lb)       PHYSICAL EXAM  Constitutional:       Appearance: Healthy appearance.   Eyes:      Pupils: Pupils are equal, round, and reactive to light.   Pulmonary:      Effort: Pulmonary effort is normal.      Breath sounds: Normal breath sounds.   Cardiovascular:      PMI at left midclavicular line. Normal rate. Regular rhythm.      Murmurs: There is no murmur.      No gallop.  No click. No rub.   Pulses:     Intact distal pulses.   Musculoskeletal: Normal range of motion.      Cervical back: Normal range of motion. Skin:     General: Skin is warm and dry.   Neurological:      General: No focal deficit present.      Mental Status: Alert and oriented to person, place and time. "           I,Ino Hager LPN   am scribing for, and in the presence of Dr. Cm Clark MD  .    I, Dr. Cm Clark MD  , personally performed the services described in the documentation as scribed by Ino Hager LPN   in my presence, and confirm it is both accurate and complete.      Dr. Cm Clark MD  Thank you for allowing me to participate in the care of this patient. Please do not hesitate to contact me with any further questions or concerns.

## 2025-04-16 ENCOUNTER — TELEPHONE (OUTPATIENT)
Dept: CARDIOLOGY | Facility: CLINIC | Age: 85
End: 2025-04-16
Payer: COMMERCIAL

## 2025-04-16 LAB
BNP SERPL-MCNC: 29 PG/ML
TROPONIN I SERPL-MCNC: 6 NG/L

## 2025-05-08 ENCOUNTER — APPOINTMENT (OUTPATIENT)
Dept: CARDIOLOGY | Facility: CLINIC | Age: 85
End: 2025-05-08
Payer: COMMERCIAL

## 2025-05-08 VITALS
SYSTOLIC BLOOD PRESSURE: 110 MMHG | WEIGHT: 222 LBS | DIASTOLIC BLOOD PRESSURE: 68 MMHG | HEART RATE: 54 BPM | HEIGHT: 68 IN | BODY MASS INDEX: 33.65 KG/M2

## 2025-05-08 DIAGNOSIS — I25.10 CORONARY ARTERY DISEASE INVOLVING NATIVE HEART, UNSPECIFIED VESSEL OR LESION TYPE, UNSPECIFIED WHETHER ANGINA PRESENT: ICD-10-CM

## 2025-05-08 DIAGNOSIS — G47.33 OBSTRUCTIVE SLEEP APNEA: ICD-10-CM

## 2025-05-08 DIAGNOSIS — I10 HYPERTENSION, ESSENTIAL, BENIGN: ICD-10-CM

## 2025-05-08 DIAGNOSIS — R07.89 OTHER CHEST PAIN: ICD-10-CM

## 2025-05-08 DIAGNOSIS — I25.10 CORONARY ARTERY DISEASE INVOLVING NATIVE CORONARY ARTERY OF NATIVE HEART WITHOUT ANGINA PECTORIS: ICD-10-CM

## 2025-05-08 DIAGNOSIS — I20.89 OTHER FORMS OF ANGINA PECTORIS: ICD-10-CM

## 2025-05-08 DIAGNOSIS — I73.9 PERIPHERAL VASCULAR DISEASE (CMS-HCC): ICD-10-CM

## 2025-05-08 DIAGNOSIS — E78.2 MIXED HYPERLIPIDEMIA: ICD-10-CM

## 2025-05-08 DIAGNOSIS — Z87.891 FORMER SMOKER: ICD-10-CM

## 2025-05-08 DIAGNOSIS — I77.810 DILATED AORTIC ROOT (CMS-HCC): ICD-10-CM

## 2025-05-08 PROCEDURE — 3074F SYST BP LT 130 MM HG: CPT | Performed by: INTERNAL MEDICINE

## 2025-05-08 PROCEDURE — 99214 OFFICE O/P EST MOD 30 MIN: CPT | Performed by: INTERNAL MEDICINE

## 2025-05-08 PROCEDURE — 1159F MED LIST DOCD IN RCRD: CPT | Performed by: INTERNAL MEDICINE

## 2025-05-08 PROCEDURE — 1036F TOBACCO NON-USER: CPT | Performed by: INTERNAL MEDICINE

## 2025-05-08 PROCEDURE — 3078F DIAST BP <80 MM HG: CPT | Performed by: INTERNAL MEDICINE

## 2025-05-08 RX ORDER — SPIRONOLACTONE 25 MG/1
25 TABLET ORAL 3 TIMES WEEKLY
Qty: 45 TABLET | Refills: 1 | Status: SHIPPED | OUTPATIENT
Start: 2025-05-09

## 2025-05-08 RX ORDER — ATORVASTATIN CALCIUM 10 MG/1
TABLET, FILM COATED ORAL
Qty: 36 TABLET | Refills: 1 | Status: SHIPPED | OUTPATIENT
Start: 2025-05-08

## 2025-05-08 RX ORDER — CARVEDILOL 3.12 MG/1
3.12 TABLET ORAL DAILY
Qty: 90 TABLET | Refills: 1 | Status: SHIPPED | OUTPATIENT
Start: 2025-05-08

## 2025-05-08 RX ORDER — ISOSORBIDE MONONITRATE 30 MG/1
30 TABLET, EXTENDED RELEASE ORAL DAILY
Qty: 90 TABLET | Refills: 1 | Status: SHIPPED | OUTPATIENT
Start: 2025-05-08 | End: 2025-11-04

## 2025-05-08 RX ORDER — LISINOPRIL 2.5 MG/1
2.5 TABLET ORAL DAILY
Qty: 90 TABLET | Refills: 1 | Status: SHIPPED | OUTPATIENT
Start: 2025-05-08 | End: 2026-05-08

## 2025-05-08 ASSESSMENT — ENCOUNTER SYMPTOMS
CONSTITUTIONAL NEGATIVE: 1
RESPIRATORY NEGATIVE: 1
NEUROLOGICAL NEGATIVE: 1
DIZZINESS: 0
CARDIOVASCULAR NEGATIVE: 1
SHORTNESS OF BREATH: 0

## 2025-05-08 NOTE — PROGRESS NOTES
CARDIOLOGY OFFICE VISIT      CHIEF COMPLAINT  Chief Complaint   Patient presents with    Follow-up     2 week follow up.        HISTORY OF PRESENT ILLNESS  Ry Aguirre is a 84 y.o. year old male patient with a history of CAD and old anterior myocardial infarction status post LAD stenting 2015.  He had recurrent chest pain for which he had a repeat cardiac catheterization in 2020 that showed patent LAD stent with mild luminal irregularities.  His LV function was normal.    He had some recent episode of recurrent chest pain for which we increased his Imdur to 30 mg daily and is now return for follow-up.  He has not had any recurrent chest pain since then.  Had a stress test in July 2020 for that showed a large apical infarct with no ischemia.  EF was 69%.    ASSESSMENT AND PLAN  1.  CAD status post LAD stenting with no recurrent chest pain since we increased his Imdur which we will continue.  Will continue with lifelong aspirin.  2.  Hypertension: Blood pressure is very well-controlled, will continue lisinopril and Imdur at the current dose.  3.  Dyslipidemia: With acceptable lipid profile, will continue with atorvastatin for lipid-lowering therapy.    Problem List Items Addressed This Visit           ICD-10-CM    CAD (coronary artery disease) I25.10    Dilated aortic root (CMS-HCC) I77.810    Hypertension, essential, benign I10    Mixed hyperlipidemia E78.2    Obstructive sleep apnea G47.33    Peripheral vascular disease (CMS-HCC) I73.9    BMI 33.0-33.9,adult Z68.33    Former smoker Z87.891    Chest pain R07.9       Recent Cardiovascular Testing:    Echo-3/2/2023  CONCLUSIONS:  1. Left ventricular systolic function is normal with a 60-65% estimated ejection fraction.  2. [place] is abnormal.  3. Spectral Doppler shows an impaired relaxation pattern of left ventricular diastolic filling.  4. There is no evidence of cardiac tamponade.  5. No evidence of mitral valve prolapse.  6. There is No tricuspid  stenosis.  7. RVSP within normal limits.  8. Aortic valve stenosis is not present.  9. Mild aortic valve regurgitation.  10. Compared with echo 6/2019 LVEF has improved.  Stress-7/31/2024  IMPRESSION:  Abnormal Lexiscan Myoview cardiac perfusion stress test.  No independent myocardial ischemia by perfusion imaging.  Large apical myocardial infarction by perfusion imaging. Consider  hibernating myocardium if no clinical history of myocardial  infarction. Normal left ventricular systolic function.  Left ventricular ejection fraction 59 %.  Noninvasive risk stratification-intermediate risk.  No previous study available for comparison.  Cath-11/13/2024  CONCLUSIONS:   1. Left Main Coronary Artery: This artery is normal.   2. Left Anterior Descending Artery: presents luminal irregularities and contains patent previously placed stents.   3. Circumflex Coronary Artery: presents luminal irregularities.   4. Right Coronary Artery: presents luminal irregularities.   5. Distal RCA Lesion: The percent stenosis is 30%.   6. The Left Ventricular Ejection Fraction is 50%.  Carotid Ultrasound-    Past Medical History  Medical History[1]    Social History  Social History[2]    Family History   Family History[3]     Allergies:  RX Allergies[4]     Outpatient Medications:  Current Outpatient Medications   Medication Instructions    atorvastatin (Lipitor) 10 mg tablet TAKE 1 TABLET BY MOUTH ON MONDAYS, WEDNESDAYS AND FRIDAYS    carvedilol (COREG) 3.125 mg, oral, Daily    cholecalciferol (Vitamin D3) 50 mcg (2,000 unit) capsule Take by mouth.  TAKE ONE TABLET DAILY ONLY IN WINTER    isosorbide mononitrate ER (IMDUR) 30 mg, oral, Daily, Do not crush or chew.    lisinopril 2.5 mg, oral, Daily    methylphenidate (RITALIN) 10 mg, 2 times daily    multivitamin (MULTIPLE VITAMINS ORAL) Orally    nitroglycerin (NITROSTAT) 0.4 mg, sublingual, Every 5 min PRN, May repeat dose every 5 minutes for up to 3 doses total.    omeprazole OTC (PRILOSEC  "OTC) 20 mg, Daily before breakfast    spironolactone (Aldactone) 25 mg tablet TAKE 1 TABLET MONDAYS, WEDNESDAYS AND FRIDAYS    vit A/vit C/vit E/zinc/copper (PRESERVISION AREDS ORAL) 1 capsule, 2 times daily        Recent Lab Results:    CBC:   Lab Results   Component Value Date    WBC 6.8 11/13/2024    RBC 4.29 (L) 11/13/2024    HGB 13.7 11/13/2024    HCT 41.7 11/13/2024     11/13/2024        CMP:    Lab Results   Component Value Date     11/13/2024    K 4.1 11/13/2024     11/13/2024    CO2 32 11/13/2024    BUN 17 11/13/2024    CREATININE 0.75 11/13/2024    GLUCOSE 125 (H) 11/13/2024    CALCIUM 9.0 11/13/2024       Magnesium:    No results found for: \"MG\"    Lipid Profile:    Lab Results   Component Value Date    TRIG 125 05/21/2024    HDL 38.6 05/21/2024    LDLCALC 83 05/21/2024       TSH:    No results found for: \"TSH\"    BNP:   Lab Results   Component Value Date    BNP 29 04/15/2025        PT/INR:    No results found for: \"PROTIME\", \"INR\"    HgBA1c:    Lab Results   Component Value Date    HGBA1C 6.6 (A) 09/09/2024       BMP:  Lab Results   Component Value Date     11/13/2024     05/21/2024     07/15/2022     04/12/2021     07/30/2020    K 4.1 11/13/2024    K 4.1 05/21/2024    K 4.3 07/15/2022    K 4.3 04/12/2021    K 3.8 07/30/2020     11/13/2024     05/21/2024     07/15/2022     04/12/2021     07/30/2020    CO2 32 11/13/2024    CO2 31 05/21/2024    CO2 33 (H) 07/15/2022    CO2 33 (H) 04/12/2021    CO2 32 07/30/2020    BUN 17 11/13/2024    BUN 20 05/21/2024    BUN 18 07/15/2022    BUN 21 04/12/2021    BUN 19 07/30/2020    CREATININE 0.75 11/13/2024    CREATININE 0.90 05/21/2024    CREATININE 0.89 07/15/2022    CREATININE 1.06 04/12/2021    CREATININE 0.99 07/30/2020       CBC:  Lab Results   Component Value Date    WBC 6.8 11/13/2024    WBC 5.8 05/21/2024    WBC 6.0 07/15/2022    WBC 6.1 04/12/2021    WBC 8.3 03/04/2020    RBC 4.29 " (L) 11/13/2024    RBC 4.37 (L) 05/21/2024    RBC 4.27 (L) 07/15/2022    RBC 4.29 (L) 04/12/2021    RBC 4.11 (L) 03/04/2020    HGB 13.7 11/13/2024    HGB 14.0 05/21/2024    HGB 13.6 07/15/2022    HGB 13.9 04/12/2021    HGB 13.0 (L) 03/04/2020    HCT 41.7 11/13/2024    HCT 43.2 05/21/2024    HCT 41.8 07/15/2022    HCT 42.5 04/12/2021    HCT 39.1 (L) 03/04/2020    MCV 97 11/13/2024    MCV 99 05/21/2024    MCV 98 07/15/2022    MCV 99 04/12/2021    MCV 95 03/04/2020    MCH 31.9 11/13/2024    MCH 32.0 05/21/2024    MCHC 32.9 11/13/2024    MCHC 32.4 05/21/2024    MCHC 32.5 07/15/2022    MCHC 32.7 04/12/2021    MCHC 33.2 03/04/2020    RDW 12.3 11/13/2024    RDW 12.4 05/21/2024    RDW 11.9 07/15/2022    RDW 11.9 04/12/2021    RDW 12.3 03/04/2020     11/13/2024     05/21/2024     07/15/2022     04/12/2021     03/04/2020       Cardiac Enzymes:    Lab Results   Component Value Date    TROPHS 6 04/15/2025       Hepatic Function Panel:    Lab Results   Component Value Date    ALKPHOS 65 05/21/2024    ALT 9 (L) 05/21/2024    AST 13 05/21/2024    PROT 6.1 (L) 05/21/2024    BILITOT 0.7 05/21/2024    BILIDIR 0.1 03/04/2021         REVIEW OF SYSTEMS  Review of Systems   Constitutional: Negative.   Cardiovascular: Negative.  Negative for chest pain.   Respiratory: Negative.  Negative for shortness of breath.    Neurological: Negative.  Negative for dizziness.       VITALS  Vitals:    05/08/25 1402   BP: 110/68   Pulse: 54     Wt Readings from Last 4 Encounters:   05/08/25 101 kg (222 lb)   04/15/25 98.2 kg (216 lb 6.4 oz)   01/23/25 96.8 kg (213 lb 6.4 oz)   12/10/24 97.6 kg (215 lb 3.2 oz)       PHYSICAL EXAM  Constitutional:       Appearance: Healthy appearance.      Comments: obese   Neck:      Thyroid: Thyroid normal.      Vascular: JVD normal.   Pulmonary:      Effort: Pulmonary effort is normal.      Breath sounds: Normal breath sounds.   Cardiovascular:      Normal rate. Regular rhythm.       Murmurs: There is no murmur.   Edema:     Peripheral edema absent.   Musculoskeletal: Normal range of motion.      Cervical back: Normal range of motion. Skin:     General: Skin is warm and dry.   Neurological:      General: No focal deficit present.      Mental Status: Alert and oriented to person, place and time.         Scribe Attestation  By signing my name below, SCHUYLER Antonieta JORDAN Michael Smalls LPN  , Scribe   attest that this documentation has been prepared under the direction and in the presence of Cm Clark MD.         [1]   Past Medical History:  Diagnosis Date    CAD (coronary artery disease)     Hyperlipidemia     Hypertension     Peripheral vascular disease (CMS-HCC)     Sleep apnea    [2]   Social History  Tobacco Use    Smoking status: Former     Current packs/day: 0.00     Types: Cigarettes     Quit date:      Years since quittin.3    Smokeless tobacco: Never   Vaping Use    Vaping status: Never Used   Substance Use Topics    Alcohol use: Yes     Alcohol/week: 2.0 - 3.0 standard drinks of alcohol     Types: 2 - 3 Glasses of wine per week    Drug use: Not Currently   [3]   Family History  Problem Relation Name Age of Onset    Hypertension Mother      Diabetes Father      Lung cancer Father      Lung cancer Brother     [4]   Allergies  Allergen Reactions    Oxycodone Hcl-Oxycodone-Asa Nausea Only and Other     Vomiting      Oxycodone-Aspirin GI Upset and Nausea/vomiting

## 2025-06-12 ENCOUNTER — APPOINTMENT (OUTPATIENT)
Dept: CARDIOLOGY | Facility: CLINIC | Age: 85
End: 2025-06-12
Payer: COMMERCIAL

## 2025-06-12 VITALS
BODY MASS INDEX: 32.89 KG/M2 | HEIGHT: 68 IN | DIASTOLIC BLOOD PRESSURE: 58 MMHG | HEART RATE: 64 BPM | WEIGHT: 217 LBS | SYSTOLIC BLOOD PRESSURE: 108 MMHG

## 2025-06-12 DIAGNOSIS — R07.89 OTHER CHEST PAIN: ICD-10-CM

## 2025-06-12 DIAGNOSIS — I10 HYPERTENSION, ESSENTIAL, BENIGN: ICD-10-CM

## 2025-06-12 DIAGNOSIS — I77.810 DILATED AORTIC ROOT: ICD-10-CM

## 2025-06-12 DIAGNOSIS — I73.9 PERIPHERAL VASCULAR DISEASE: ICD-10-CM

## 2025-06-12 DIAGNOSIS — E78.2 MIXED HYPERLIPIDEMIA: ICD-10-CM

## 2025-06-12 DIAGNOSIS — Z87.891 FORMER SMOKER: ICD-10-CM

## 2025-06-12 DIAGNOSIS — I25.10 CORONARY ARTERY DISEASE INVOLVING NATIVE CORONARY ARTERY OF NATIVE HEART WITHOUT ANGINA PECTORIS: ICD-10-CM

## 2025-06-12 PROCEDURE — 3074F SYST BP LT 130 MM HG: CPT | Performed by: INTERNAL MEDICINE

## 2025-06-12 PROCEDURE — 99214 OFFICE O/P EST MOD 30 MIN: CPT | Performed by: INTERNAL MEDICINE

## 2025-06-12 PROCEDURE — 1159F MED LIST DOCD IN RCRD: CPT | Performed by: INTERNAL MEDICINE

## 2025-06-12 PROCEDURE — 1036F TOBACCO NON-USER: CPT | Performed by: INTERNAL MEDICINE

## 2025-06-12 PROCEDURE — 3078F DIAST BP <80 MM HG: CPT | Performed by: INTERNAL MEDICINE

## 2025-06-12 RX ORDER — RANOLAZINE 500 MG/1
500 TABLET, EXTENDED RELEASE ORAL 2 TIMES DAILY
Qty: 60 TABLET | Refills: 11 | Status: SHIPPED | OUTPATIENT
Start: 2025-06-12 | End: 2026-06-12

## 2025-06-12 ASSESSMENT — ENCOUNTER SYMPTOMS
NEUROLOGICAL NEGATIVE: 1
CARDIOVASCULAR NEGATIVE: 1
RESPIRATORY NEGATIVE: 1
CONSTITUTIONAL NEGATIVE: 1

## 2025-06-12 NOTE — PROGRESS NOTES
CARDIOLOGY OFFICE VISIT      CHIEF COMPLAINT  Chief Complaint   Patient presents with    Chest Pain     Reoccurrence. Patient states does not currently have CP        HISTORY OF PRESENT ILLNESS  Ry Aguirre is a 84 y.o. year old male patient with a history of CAD and old anterior myocardial infarction status post LAD stenting 2015.  He had recurrent chest pain for which he had a repeat cardiac catheterization in 2020 that showed patent LAD stent with mild luminal irregularities.  His LV function was normal.    He had some recent episode of recurrent chest pain for which we increased his Imdur to 30 mg daily and he said this was helpful for the first few weeks but still complains of some occasional chest tightness with a sense of doom sometimes.  He has taking an extra Imdur with some relief.  He had a repeat cardiac catheterization in November 2020 for that was unchanged with patent LAD stent and mild luminal irregularities in the rest of the coronaries.    ASSESSMENT AND PLAN  1.  CAD status post LAD stenting with some intermittent chest pain that appears to be atypical.  I had a lengthy discussion with the patient and given the option of repeat cardiac catheterization, but he wishes to continue to optimize medical therapy.  We will start him on Ranexa 500 mg twice a day and reevaluate in a few weeks as scheduled.    2.  Hypertension: Blood pressure is very well-controlled, will continue lisinopril and Imdur at the current dose.  3.  Dyslipidemia: With acceptable lipid profile, will continue with atorvastatin for lipid-lowering therapy.    ASSESSMENT AND PLAN  Problem List Items Addressed This Visit       CAD (coronary artery disease)    Dilated aortic root    Hypertension, essential, benign    Mixed hyperlipidemia    Peripheral vascular disease    BMI 32.0-32.9,adult    Former smoker    Chest pain           Past Medical History  Medical History[1]    Social History  Social History[2]    Family  "History   Family History[3]     Allergies:  RX Allergies[4]     Outpatient Medications:  Current Outpatient Medications   Medication Instructions    atorvastatin (Lipitor) 10 mg tablet TAKE 1 TABLET BY MOUTH ON MONDAYS, WEDNESDAYS AND FRIDAYS    carvedilol (COREG) 3.125 mg, oral, Daily    cholecalciferol (Vitamin D3) 50 mcg (2,000 unit) capsule Take by mouth.  TAKE ONE TABLET DAILY ONLY IN WINTER    isosorbide mononitrate ER (IMDUR) 30 mg, oral, Daily, Do not crush or chew.    lisinopril 2.5 mg, oral, Daily    methylphenidate (RITALIN) 10 mg, 2 times daily    multivitamin (MULTIPLE VITAMINS ORAL) Orally    nitroglycerin (NITROSTAT) 0.4 mg, sublingual, Every 5 min PRN, May repeat dose every 5 minutes for up to 3 doses total.    omeprazole OTC (PRILOSEC OTC) 20 mg, Daily before breakfast    spironolactone (ALDACTONE) 25 mg, oral, 3 times weekly    vit A/vit C/vit E/zinc/copper (PRESERVISION AREDS ORAL) 1 capsule, 2 times daily        Recent Lab Results:  I have personally reviewed the below labs in detail;    CBC:   Lab Results   Component Value Date    WBC 6.8 11/13/2024    RBC 4.29 (L) 11/13/2024    HGB 13.7 11/13/2024    HCT 41.7 11/13/2024     11/13/2024        CMP:    Lab Results   Component Value Date     11/13/2024    K 4.1 11/13/2024     11/13/2024    CO2 32 11/13/2024    BUN 17 11/13/2024    CREATININE 0.75 11/13/2024    GLUCOSE 125 (H) 11/13/2024    CALCIUM 9.0 11/13/2024       Magnesium:    No results found for: \"MG\"    Lipid Profile:    Lab Results   Component Value Date    TRIG 125 05/21/2024    HDL 38.6 05/21/2024    LDLCALC 83 05/21/2024       TSH:    No results found for: \"TSH\"    BNP:   Lab Results   Component Value Date    BNP 29 04/15/2025        PT/INR:    No results found for: \"PROTIME\", \"INR\"    HgBA1c:    Lab Results   Component Value Date    HGBA1C 6.6 (A) 09/09/2024       BMP:  Lab Results   Component Value Date     11/13/2024     05/21/2024     07/15/2022 "     04/12/2021     07/30/2020    K 4.1 11/13/2024    K 4.1 05/21/2024    K 4.3 07/15/2022    K 4.3 04/12/2021    K 3.8 07/30/2020     11/13/2024     05/21/2024     07/15/2022     04/12/2021     07/30/2020    CO2 32 11/13/2024    CO2 31 05/21/2024    CO2 33 (H) 07/15/2022    CO2 33 (H) 04/12/2021    CO2 32 07/30/2020    BUN 17 11/13/2024    BUN 20 05/21/2024    BUN 18 07/15/2022    BUN 21 04/12/2021    BUN 19 07/30/2020    CREATININE 0.75 11/13/2024    CREATININE 0.90 05/21/2024    CREATININE 0.89 07/15/2022    CREATININE 1.06 04/12/2021    CREATININE 0.99 07/30/2020       CBC:  Lab Results   Component Value Date    WBC 6.8 11/13/2024    WBC 5.8 05/21/2024    WBC 6.0 07/15/2022    WBC 6.1 04/12/2021    WBC 8.3 03/04/2020    RBC 4.29 (L) 11/13/2024    RBC 4.37 (L) 05/21/2024    RBC 4.27 (L) 07/15/2022    RBC 4.29 (L) 04/12/2021    RBC 4.11 (L) 03/04/2020    HGB 13.7 11/13/2024    HGB 14.0 05/21/2024    HGB 13.6 07/15/2022    HGB 13.9 04/12/2021    HGB 13.0 (L) 03/04/2020    HCT 41.7 11/13/2024    HCT 43.2 05/21/2024    HCT 41.8 07/15/2022    HCT 42.5 04/12/2021    HCT 39.1 (L) 03/04/2020    MCV 97 11/13/2024    MCV 99 05/21/2024    MCV 98 07/15/2022    MCV 99 04/12/2021    MCV 95 03/04/2020    MCH 31.9 11/13/2024    MCH 32.0 05/21/2024    MCHC 32.9 11/13/2024    MCHC 32.4 05/21/2024    MCHC 32.5 07/15/2022    MCHC 32.7 04/12/2021    MCHC 33.2 03/04/2020    RDW 12.3 11/13/2024    RDW 12.4 05/21/2024    RDW 11.9 07/15/2022    RDW 11.9 04/12/2021    RDW 12.3 03/04/2020     11/13/2024     05/21/2024     07/15/2022     04/12/2021     03/04/2020       Cardiac Enzymes:    Lab Results   Component Value Date    TROPHS 6 04/15/2025       Hepatic Function Panel:    Lab Results   Component Value Date    ALKPHOS 65 05/21/2024    ALT 9 (L) 05/21/2024    AST 13 05/21/2024    PROT 6.1 (L) 05/21/2024    BILITOT 0.7 05/21/2024    BILIDIR 0.1 03/04/2021          REVIEW OF SYSTEMS  Review of Systems   Constitutional: Negative.   Cardiovascular: Negative.    Respiratory: Negative.     Neurological: Negative.    All other systems reviewed and are negative.      VITALS  Vitals:    25 1057   BP: 108/58   Pulse: 64     Wt Readings from Last 4 Encounters:   25 98.4 kg (217 lb)   25 101 kg (222 lb)   04/15/25 98.2 kg (216 lb 6.4 oz)   25 96.8 kg (213 lb 6.4 oz)       PHYSICAL EXAM  Constitutional:       Appearance: Healthy appearance.   Eyes:      Pupils: Pupils are equal, round, and reactive to light.   Pulmonary:      Effort: Pulmonary effort is normal.      Breath sounds: Normal breath sounds.   Cardiovascular:      PMI at left midclavicular line. Normal rate. Regular rhythm.      Murmurs: There is no murmur.      No gallop.  No click. No rub.   Pulses:     Intact distal pulses.   Musculoskeletal: Normal range of motion.      Cervical back: Normal range of motion. Skin:     General: Skin is warm and dry.   Neurological:      General: No focal deficit present.      Mental Status: Alert and oriented to person, place and time.           IIno LPN   am scribing for, and in the presence of Dr. Cm Clark MD  .    I, Dr. Cm Clark MD  , personally performed the services described in the documentation as scribed by Ino Hager LPN   in my presence, and confirm it is both accurate and complete.      Dr. Cm Clark MD  Thank you for allowing me to participate in the care of this patient. Please do not hesitate to contact me with any further questions or concerns.         [1]   Past Medical History:  Diagnosis Date    CAD (coronary artery disease)     Hyperlipidemia     Hypertension     Peripheral vascular disease     Sleep apnea    [2]   Social History  Tobacco Use    Smoking status: Former     Current packs/day: 0.00     Types: Cigarettes     Quit date: 1980     Years since quittin.4    Smokeless tobacco: Never    Vaping Use    Vaping status: Never Used   Substance Use Topics    Alcohol use: Yes     Alcohol/week: 2.0 - 3.0 standard drinks of alcohol     Types: 2 - 3 Glasses of wine per week    Drug use: Not Currently   [3]   Family History  Problem Relation Name Age of Onset    Hypertension Mother      Diabetes Father      Lung cancer Father      Lung cancer Brother     [4]   Allergies  Allergen Reactions    Oxycodone Hcl-Oxycodone-Asa Nausea Only and Other     Vomiting      Oxycodone-Aspirin GI Upset and Nausea/vomiting

## 2025-06-12 NOTE — PATIENT INSTRUCTIONS
Start Ranexa 500 mg BID    Patient educated on proper medication use.   Patient educated on risk factor modification.   Please bring any lab results from other providers / physicians to your next appointment.     Please bring all medicines, vitamins, and herbal supplements with you when you come to the office.     Prescriptions will not be filled unless you are compliant with your follow up appointments or have a follow up appointment scheduled as per instruction of your physician. Refills should be requested at the time of your visit.  4-6 week visit

## 2025-06-30 DIAGNOSIS — E78.2 MIXED HYPERLIPIDEMIA: ICD-10-CM

## 2025-07-01 RX ORDER — ATORVASTATIN CALCIUM 10 MG/1
TABLET, FILM COATED ORAL
Qty: 36 TABLET | Refills: 1 | Status: SHIPPED | OUTPATIENT
Start: 2025-07-01

## 2025-07-17 ENCOUNTER — APPOINTMENT (OUTPATIENT)
Dept: CARDIOLOGY | Facility: CLINIC | Age: 85
End: 2025-07-17
Payer: COMMERCIAL

## 2025-07-17 ENCOUNTER — OFFICE VISIT (OUTPATIENT)
Dept: CARDIOLOGY | Facility: CLINIC | Age: 85
End: 2025-07-17
Payer: COMMERCIAL

## 2025-07-17 VITALS
BODY MASS INDEX: 32.55 KG/M2 | HEIGHT: 68 IN | WEIGHT: 214.8 LBS | SYSTOLIC BLOOD PRESSURE: 124 MMHG | DIASTOLIC BLOOD PRESSURE: 62 MMHG | OXYGEN SATURATION: 93 % | HEART RATE: 82 BPM

## 2025-07-17 DIAGNOSIS — E66.09 CLASS 1 OBESITY DUE TO EXCESS CALORIES WITH SERIOUS COMORBIDITY AND BODY MASS INDEX (BMI) OF 31.0 TO 31.9 IN ADULT: ICD-10-CM

## 2025-07-17 DIAGNOSIS — I25.10 CORONARY ARTERY DISEASE INVOLVING NATIVE CORONARY ARTERY OF NATIVE HEART WITHOUT ANGINA PECTORIS: ICD-10-CM

## 2025-07-17 DIAGNOSIS — E78.2 MIXED HYPERLIPIDEMIA: ICD-10-CM

## 2025-07-17 DIAGNOSIS — E66.811 CLASS 1 OBESITY DUE TO EXCESS CALORIES WITH SERIOUS COMORBIDITY AND BODY MASS INDEX (BMI) OF 31.0 TO 31.9 IN ADULT: ICD-10-CM

## 2025-07-17 DIAGNOSIS — I45.10 RIGHT BUNDLE BRANCH BLOCK (RBBB): ICD-10-CM

## 2025-07-17 DIAGNOSIS — I77.810 DILATED AORTIC ROOT: ICD-10-CM

## 2025-07-17 DIAGNOSIS — Z87.891 FORMER SMOKER: ICD-10-CM

## 2025-07-17 DIAGNOSIS — I10 HYPERTENSION, ESSENTIAL, BENIGN: ICD-10-CM

## 2025-07-17 PROCEDURE — 3074F SYST BP LT 130 MM HG: CPT | Performed by: INTERNAL MEDICINE

## 2025-07-17 PROCEDURE — 1159F MED LIST DOCD IN RCRD: CPT | Performed by: INTERNAL MEDICINE

## 2025-07-17 PROCEDURE — 99214 OFFICE O/P EST MOD 30 MIN: CPT | Performed by: INTERNAL MEDICINE

## 2025-07-17 PROCEDURE — 3078F DIAST BP <80 MM HG: CPT | Performed by: INTERNAL MEDICINE

## 2025-07-17 RX ORDER — COLCHICINE 0.6 MG/1
TABLET ORAL
Qty: 32 TABLET | Refills: 0 | Status: SHIPPED | OUTPATIENT
Start: 2025-07-17

## 2025-07-17 ASSESSMENT — ENCOUNTER SYMPTOMS
RESPIRATORY NEGATIVE: 1
CONSTITUTIONAL NEGATIVE: 1
NEUROLOGICAL NEGATIVE: 1
CARDIOVASCULAR NEGATIVE: 1

## 2025-07-17 NOTE — PATIENT INSTRUCTIONS
Start colchine 0.6 mg two for 1st  3 days then  one a day  3  weeks      Patient educated on proper medication use.   Patient educated on risk factor modification.   Please bring any lab results from other providers / physicians to your next appointment.     Please bring all medicines, vitamins, and herbal supplements with you when you come to the office.     Prescriptions will not be filled unless you are compliant with your follow up appointments or have a follow up appointment scheduled as per instruction of your physician. Refills should be requested at the time of your visit.

## 2025-07-17 NOTE — PROGRESS NOTES
CARDIOLOGY OFFICE VISIT      CHIEF COMPLAINT  Chief Complaint   Patient presents with    Follow-up    Chest Pain     For the past few weeks        HISTORY OF PRESENT ILLNESS  Ry Aguirre is a 84 y.o. year old male patient with a history of CAD and old anterior myocardial infarction status post LAD stenting 2015.  He had recurrent chest pain for which he had a repeat cath in 2020 which showed patent LAD stent with mild luminal irregularities.  LV function was normal.  He has been complaining of some recurrent chest pain recently for which we started him on Imdur with some relief after a few weeks with recurrent chest pain.  I therefore repeated the cardiac catheterization in November 2024 which was unchanged with patent LAD stent and mild luminal irregularities.  Subsequently started him on Ranexa with some improvement.    Lately he said he had some chest pain which seems to be worse when he is recumbent and better when he sits upright.  He does not have exertional chest pain.  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  CAD status post anterior MI and LAD stent with no change in repeat cardiac catheterization as noted above.  He is recent description of the chest pain appears to be pleuritic in nature so I will try him on colchicine with 1.2 mg for 2 days and maintenance dose of 0.6 mg and reevaluate in 2 to 3 weeks.  He if he continues to have recurrent chest pain, will consider repeat cardiac catheterization when he returns for follow-up.  2.  Hypertension, blood pressure is well-controlled we will continue lisinopril and Imdur at the current dose.  3.  Dyslipidemia: With acceptable lipid profile as noted below, will continue with atorvastatin for lipid-lowering therapy.  Problem List Items Addressed This Visit       CAD (coronary artery disease)    Dilated aortic root    Hypertension, essential,  "benign    Mixed hyperlipidemia    Class 1 obesity due to excess calories with serious comorbidity and body mass index (BMI) of 31.0 to 31.9 in adult    Right bundle branch block (RBBB)    Former smoker           Past Medical History  Medical History[1]    Social History  Social History[2]    Family History   Family History[3]     Allergies:  RX Allergies[4]     Outpatient Medications:  Current Outpatient Medications   Medication Instructions    atorvastatin (Lipitor) 10 mg tablet TAKE 1 TABLET BY MOUTH ON MONDAYS, WEDNESDAYS AND FRIDAYS    carvedilol (COREG) 3.125 mg, oral, Daily    cholecalciferol (Vitamin D3) 50 mcg (2,000 unit) capsule Take by mouth.  TAKE ONE TABLET DAILY ONLY IN WINTER    isosorbide mononitrate ER (IMDUR) 30 mg, oral, Daily, Do not crush or chew.    lisinopril 2.5 mg, oral, Daily    methylphenidate (RITALIN) 10 mg, 2 times daily    multivitamin (MULTIPLE VITAMINS ORAL) Orally    nitroglycerin (NITROSTAT) 0.4 mg, sublingual, Every 5 min PRN, May repeat dose every 5 minutes for up to 3 doses total.    omeprazole OTC (PRILOSEC OTC) 20 mg, Daily before breakfast    ranolazine (RANEXA) 500 mg, oral, 2 times daily, Do not crush, chew, or split.    spironolactone (ALDACTONE) 25 mg, oral, 3 times weekly        Recent Lab Results:  I have personally reviewed the below labs in detail;    CBC:   Lab Results   Component Value Date    WBC 6.8 11/13/2024    RBC 4.29 (L) 11/13/2024    HGB 13.7 11/13/2024    HCT 41.7 11/13/2024     11/13/2024        CMP:    Lab Results   Component Value Date     11/13/2024    K 4.1 11/13/2024     11/13/2024    CO2 32 11/13/2024    BUN 17 11/13/2024    CREATININE 0.75 11/13/2024    GLUCOSE 125 (H) 11/13/2024    CALCIUM 9.0 11/13/2024       Magnesium:    No results found for: \"MG\"    Lipid Profile:    Lab Results   Component Value Date    TRIG 125 05/21/2024    HDL 38.6 05/21/2024    LDLCALC 83 05/21/2024       TSH:    No results found for: \"TSH\"    BNP:   Lab " "Results   Component Value Date    BNP 29 04/15/2025        PT/INR:    No results found for: \"PROTIME\", \"INR\"    HgBA1c:    Lab Results   Component Value Date    HGBA1C 6.6 (A) 09/09/2024       BMP:  Lab Results   Component Value Date     11/13/2024     05/21/2024     07/15/2022     04/12/2021     07/30/2020    K 4.1 11/13/2024    K 4.1 05/21/2024    K 4.3 07/15/2022    K 4.3 04/12/2021    K 3.8 07/30/2020     11/13/2024     05/21/2024     07/15/2022     04/12/2021     07/30/2020    CO2 32 11/13/2024    CO2 31 05/21/2024    CO2 33 (H) 07/15/2022    CO2 33 (H) 04/12/2021    CO2 32 07/30/2020    BUN 17 11/13/2024    BUN 20 05/21/2024    BUN 18 07/15/2022    BUN 21 04/12/2021    BUN 19 07/30/2020    CREATININE 0.75 11/13/2024    CREATININE 0.90 05/21/2024    CREATININE 0.89 07/15/2022    CREATININE 1.06 04/12/2021    CREATININE 0.99 07/30/2020       CBC:  Lab Results   Component Value Date    WBC 6.8 11/13/2024    WBC 5.8 05/21/2024    WBC 6.0 07/15/2022    WBC 6.1 04/12/2021    WBC 8.3 03/04/2020    RBC 4.29 (L) 11/13/2024    RBC 4.37 (L) 05/21/2024    RBC 4.27 (L) 07/15/2022    RBC 4.29 (L) 04/12/2021    RBC 4.11 (L) 03/04/2020    HGB 13.7 11/13/2024    HGB 14.0 05/21/2024    HGB 13.6 07/15/2022    HGB 13.9 04/12/2021    HGB 13.0 (L) 03/04/2020    HCT 41.7 11/13/2024    HCT 43.2 05/21/2024    HCT 41.8 07/15/2022    HCT 42.5 04/12/2021    HCT 39.1 (L) 03/04/2020    MCV 97 11/13/2024    MCV 99 05/21/2024    MCV 98 07/15/2022    MCV 99 04/12/2021    MCV 95 03/04/2020    MCH 31.9 11/13/2024    MCH 32.0 05/21/2024    MCHC 32.9 11/13/2024    MCHC 32.4 05/21/2024    MCHC 32.5 07/15/2022    MCHC 32.7 04/12/2021    MCHC 33.2 03/04/2020    RDW 12.3 11/13/2024    RDW 12.4 05/21/2024    RDW 11.9 07/15/2022    RDW 11.9 04/12/2021    RDW 12.3 03/04/2020     11/13/2024     05/21/2024     07/15/2022     04/12/2021     03/04/2020 "       Cardiac Enzymes:    Lab Results   Component Value Date    TROPHS 6 04/15/2025       Hepatic Function Panel:    Lab Results   Component Value Date    ALKPHOS 65 05/21/2024    ALT 9 (L) 05/21/2024    AST 13 05/21/2024    PROT 6.1 (L) 05/21/2024    BILITOT 0.7 05/21/2024    BILIDIR 0.1 03/04/2021         REVIEW OF SYSTEMS  Review of Systems   Constitutional: Negative.   Cardiovascular: Negative.    Respiratory: Negative.     Neurological: Negative.    All other systems reviewed and are negative.      VITALS  There were no vitals filed for this visit.  Wt Readings from Last 4 Encounters:   06/12/25 98.4 kg (217 lb)   05/08/25 101 kg (222 lb)   04/15/25 98.2 kg (216 lb 6.4 oz)   01/23/25 96.8 kg (213 lb 6.4 oz)       PHYSICAL EXAM  Constitutional:       Appearance: Healthy appearance.   Eyes:      Pupils: Pupils are equal, round, and reactive to light.   Pulmonary:      Effort: Pulmonary effort is normal.      Breath sounds: Normal breath sounds.   Cardiovascular:      PMI at left midclavicular line. Normal rate. Regular rhythm.      Murmurs: There is no murmur.      No gallop.  No click. No rub.   Pulses:     Intact distal pulses.   Musculoskeletal: Normal range of motion.      Cervical back: Normal range of motion. Skin:     General: Skin is warm and dry.   Neurological:      General: No focal deficit present.      Mental Status: Alert and oriented to person, place and time.         IIon LPN   am scribing for, and in the presence of Dr. Cm Clark MD  .    I, Dr. Cm Clark MD  , personally performed the services described in the documentation as scribed by Ino Hager LPN   in my presence, and confirm it is both accurate and complete.      Dr. Cm Clark MD  Thank you for allowing me to participate in the care of this patient. Please do not hesitate to contact me with any further questions or concerns.         [1]   Past Medical History:  Diagnosis Date    CAD (coronary artery  disease)     Hyperlipidemia     Hypertension     Peripheral vascular disease     Sleep apnea    [2]   Social History  Tobacco Use    Smoking status: Former     Current packs/day: 0.00     Types: Cigarettes     Quit date:      Years since quittin.5    Smokeless tobacco: Never   Vaping Use    Vaping status: Never Used   Substance Use Topics    Alcohol use: Yes     Alcohol/week: 2.0 - 3.0 standard drinks of alcohol     Types: 2 - 3 Glasses of wine per week    Drug use: Not Currently   [3]   Family History  Problem Relation Name Age of Onset    Hypertension Mother      Diabetes Father      Lung cancer Father      Lung cancer Brother     [4]   Allergies  Allergen Reactions    Oxycodone Hcl-Oxycodone-Asa Nausea Only and Other     Vomiting      Oxycodone-Aspirin GI Upset and Nausea/vomiting      X Size Of Lesion In Cm: 0

## 2025-07-18 DIAGNOSIS — I45.10 RIGHT BUNDLE BRANCH BLOCK (RBBB): ICD-10-CM

## 2025-07-18 DIAGNOSIS — I10 HYPERTENSION, ESSENTIAL, BENIGN: ICD-10-CM

## 2025-07-22 RX ORDER — COLCHICINE 0.6 MG/1
TABLET ORAL
Qty: 32 TABLET | Refills: 0 | Status: SHIPPED | OUTPATIENT
Start: 2025-07-22

## 2025-08-06 DIAGNOSIS — I25.10 CORONARY ARTERY DISEASE INVOLVING NATIVE CORONARY ARTERY OF NATIVE HEART WITHOUT ANGINA PECTORIS: ICD-10-CM

## 2025-08-06 DIAGNOSIS — R07.89 OTHER CHEST PAIN: ICD-10-CM

## 2025-08-06 RX ORDER — RANOLAZINE 500 MG/1
500 TABLET, EXTENDED RELEASE ORAL 2 TIMES DAILY
Qty: 180 TABLET | Refills: 3 | Status: SHIPPED | OUTPATIENT
Start: 2025-08-06 | End: 2026-08-06

## 2025-08-06 NOTE — TELEPHONE ENCOUNTER
Pt called in requesting refill for ranolazine 500mg to be sent to [facility]    Lov 07/17/2025  Pov 08/14/2025    Thank you!

## 2025-08-14 ENCOUNTER — APPOINTMENT (OUTPATIENT)
Dept: CARDIOLOGY | Facility: CLINIC | Age: 85
End: 2025-08-14
Payer: COMMERCIAL

## 2025-08-14 VITALS
HEIGHT: 68 IN | SYSTOLIC BLOOD PRESSURE: 84 MMHG | BODY MASS INDEX: 32.1 KG/M2 | WEIGHT: 211.8 LBS | HEART RATE: 76 BPM | DIASTOLIC BLOOD PRESSURE: 60 MMHG

## 2025-08-14 DIAGNOSIS — E78.2 MIXED HYPERLIPIDEMIA: ICD-10-CM

## 2025-08-14 DIAGNOSIS — Z87.891 FORMER SMOKER: ICD-10-CM

## 2025-08-14 DIAGNOSIS — E66.811 CLASS 1 OBESITY DUE TO EXCESS CALORIES WITH SERIOUS COMORBIDITY AND BODY MASS INDEX (BMI) OF 31.0 TO 31.9 IN ADULT: ICD-10-CM

## 2025-08-14 DIAGNOSIS — I45.10 RIGHT BUNDLE BRANCH BLOCK (RBBB): ICD-10-CM

## 2025-08-14 DIAGNOSIS — I10 HYPERTENSION, ESSENTIAL, BENIGN: ICD-10-CM

## 2025-08-14 DIAGNOSIS — I25.10 CORONARY ARTERY DISEASE INVOLVING NATIVE CORONARY ARTERY OF NATIVE HEART WITHOUT ANGINA PECTORIS: ICD-10-CM

## 2025-08-14 DIAGNOSIS — G47.33 OBSTRUCTIVE SLEEP APNEA: ICD-10-CM

## 2025-08-14 DIAGNOSIS — I77.810 DILATED AORTIC ROOT: ICD-10-CM

## 2025-08-14 DIAGNOSIS — E66.09 CLASS 1 OBESITY DUE TO EXCESS CALORIES WITH SERIOUS COMORBIDITY AND BODY MASS INDEX (BMI) OF 31.0 TO 31.9 IN ADULT: ICD-10-CM

## 2025-08-14 PROCEDURE — 99214 OFFICE O/P EST MOD 30 MIN: CPT | Performed by: INTERNAL MEDICINE

## 2025-08-14 PROCEDURE — 3078F DIAST BP <80 MM HG: CPT | Performed by: INTERNAL MEDICINE

## 2025-08-14 PROCEDURE — 1159F MED LIST DOCD IN RCRD: CPT | Performed by: INTERNAL MEDICINE

## 2025-08-14 PROCEDURE — 3074F SYST BP LT 130 MM HG: CPT | Performed by: INTERNAL MEDICINE

## 2025-08-15 RX ORDER — RANOLAZINE 1000 MG/1
1000 TABLET, EXTENDED RELEASE ORAL 2 TIMES DAILY
Qty: 180 TABLET | Refills: 3 | Status: SHIPPED | OUTPATIENT
Start: 2025-08-15 | End: 2026-08-15

## 2025-10-16 ENCOUNTER — APPOINTMENT (OUTPATIENT)
Dept: CARDIOLOGY | Facility: CLINIC | Age: 85
End: 2025-10-16
Payer: COMMERCIAL

## 2025-11-20 ENCOUNTER — APPOINTMENT (OUTPATIENT)
Dept: CARDIOLOGY | Facility: CLINIC | Age: 85
End: 2025-11-20
Payer: COMMERCIAL

## (undated) DEVICE — MANIFOLD KIT, CUSTOM (SJM)

## (undated) DEVICE — TR BAND, RADIAL COMPRESSION, LONG, 29CM

## (undated) DEVICE — MBRACE, WRIST SUPPORT WITH HOOK

## (undated) DEVICE — Device

## (undated) DEVICE — CATHETER, OPTITORQUE, 5FR, JACKY, 3.5/ 2H/110CM, CURVED

## (undated) DEVICE — SHEATH, GLIDESHEATH, SLENDER, 6FR 10CM